# Patient Record
Sex: FEMALE | Race: WHITE | NOT HISPANIC OR LATINO | Employment: OTHER | URBAN - METROPOLITAN AREA
[De-identification: names, ages, dates, MRNs, and addresses within clinical notes are randomized per-mention and may not be internally consistent; named-entity substitution may affect disease eponyms.]

---

## 2017-03-08 ENCOUNTER — OFFICE VISIT (OUTPATIENT)
Dept: ENDOCRINOLOGY | Facility: CLINIC | Age: 22
End: 2017-03-08

## 2017-03-08 VITALS
BODY MASS INDEX: 24.98 KG/M2 | OXYGEN SATURATION: 97 % | HEIGHT: 71 IN | SYSTOLIC BLOOD PRESSURE: 110 MMHG | WEIGHT: 178.4 LBS | HEART RATE: 67 BPM | DIASTOLIC BLOOD PRESSURE: 68 MMHG

## 2017-03-08 DIAGNOSIS — E03.8 HYPOTHYROIDISM DUE TO HASHIMOTO'S THYROIDITIS: Primary | ICD-10-CM

## 2017-03-08 DIAGNOSIS — E06.3 HYPOTHYROIDISM DUE TO HASHIMOTO'S THYROIDITIS: Primary | ICD-10-CM

## 2017-03-08 PROCEDURE — 99243 OFF/OP CNSLTJ NEW/EST LOW 30: CPT | Performed by: INTERNAL MEDICINE

## 2017-03-08 RX ORDER — LEVOTHYROXINE SODIUM 88 UG/1
88 TABLET ORAL DAILY
Qty: 30 TABLET | Refills: 11 | Status: SHIPPED | OUTPATIENT
Start: 2017-03-08 | End: 2018-04-24 | Stop reason: SDUPTHER

## 2017-05-25 ENCOUNTER — TELEPHONE (OUTPATIENT)
Dept: FAMILY MEDICINE CLINIC | Facility: CLINIC | Age: 22
End: 2017-05-25

## 2017-05-25 RX ORDER — ESCITALOPRAM OXALATE 20 MG/1
20 TABLET ORAL DAILY
Qty: 30 TABLET | Refills: 0 | Status: SHIPPED | OUTPATIENT
Start: 2017-05-25 | End: 2017-07-05 | Stop reason: SDUPTHER

## 2017-07-05 ENCOUNTER — OFFICE VISIT (OUTPATIENT)
Dept: FAMILY MEDICINE CLINIC | Facility: CLINIC | Age: 22
End: 2017-07-05

## 2017-07-05 VITALS
HEART RATE: 75 BPM | HEIGHT: 71 IN | OXYGEN SATURATION: 99 % | TEMPERATURE: 98.9 F | BODY MASS INDEX: 23.8 KG/M2 | DIASTOLIC BLOOD PRESSURE: 64 MMHG | WEIGHT: 170 LBS | SYSTOLIC BLOOD PRESSURE: 100 MMHG

## 2017-07-05 DIAGNOSIS — F32.A DEPRESSION, UNSPECIFIED DEPRESSION TYPE: Primary | ICD-10-CM

## 2017-07-05 PROCEDURE — 99213 OFFICE O/P EST LOW 20 MIN: CPT | Performed by: NURSE PRACTITIONER

## 2017-07-05 RX ORDER — ESCITALOPRAM OXALATE 20 MG/1
20 TABLET ORAL DAILY
Qty: 30 TABLET | Refills: 5 | Status: SHIPPED | OUTPATIENT
Start: 2017-07-05 | End: 2018-05-15 | Stop reason: SDUPTHER

## 2017-07-05 RX ORDER — CLINDAMYCIN PHOSPHATE 10 UG/ML
LOTION TOPICAL
COMMUNITY
Start: 2017-07-01 | End: 2018-06-01

## 2017-07-05 NOTE — PROGRESS NOTES
Subjective   Kelley Crews is a 22 y.o. female.     History of Present Illness Patient is here for a refill of her Lexapro. Has been on it for years. No side effects. Feels it is effective. Doing well on Levothyroxine. She is seeing an endocrinologist.    The following portions of the patient's history were reviewed and updated as appropriate: allergies, current medications, past family history, past medical history, past social history, past surgical history and problem list.    Review of Systems   Constitutional: Negative for fatigue, fever and unexpected weight change.   HENT: Negative for congestion, hearing loss, nosebleeds, rhinorrhea, sore throat, trouble swallowing and voice change.    Eyes: Negative for pain, discharge, redness and visual disturbance.   Respiratory: Negative for cough, chest tightness, shortness of breath and wheezing.    Cardiovascular: Negative for chest pain, palpitations and leg swelling.   Gastrointestinal: Negative for abdominal distention, abdominal pain, anal bleeding, blood in stool, constipation, diarrhea, nausea and vomiting.   Endocrine: Negative for cold intolerance, heat intolerance, polydipsia, polyphagia and polyuria.   Genitourinary: Negative for dysuria, flank pain, frequency and hematuria.   Musculoskeletal: Negative for arthralgias, gait problem, joint swelling and myalgias.   Skin: Negative for color change and rash.   Neurological: Negative for dizziness, tremors, seizures, syncope, speech difficulty, weakness, numbness and headaches.   Hematological: Negative.    Psychiatric/Behavioral: Negative.        Objective   Physical Exam   Constitutional: She is oriented to person, place, and time. She appears well-developed and well-nourished. No distress.   HENT:   Head: Normocephalic and atraumatic.   Right Ear: Tympanic membrane and external ear normal.   Left Ear: Tympanic membrane and external ear normal.   Nose: Nose normal.   Mouth/Throat: Oropharynx is clear and  moist. No oropharyngeal exudate.   Eyes: Conjunctivae are normal. Pupils are equal, round, and reactive to light. Right eye exhibits no discharge. Left eye exhibits no discharge. No scleral icterus.   Neck: Neck supple. No tracheal deviation present. No thyromegaly present.   Cardiovascular: Normal rate, regular rhythm and normal heart sounds.  Exam reveals no gallop and no friction rub.    No murmur heard.  Pulmonary/Chest: Effort normal and breath sounds normal. No respiratory distress. She has no wheezes.   Abdominal: Soft. Bowel sounds are normal. She exhibits no distension and no mass. There is no tenderness.   Musculoskeletal: She exhibits no edema or deformity.   Lymphadenopathy:     She has no cervical adenopathy.   Neurological: She is alert and oriented to person, place, and time. Coordination normal.   Skin: Skin is warm and dry. No rash noted. No erythema.   Psychiatric: She has a normal mood and affect. Her speech is normal and behavior is normal. Judgment and thought content normal.   Nursing note and vitals reviewed.      Assessment/Plan   Kelley was seen today for depression.    Diagnoses and all orders for this visit:    Depression, unspecified depression type  -     escitalopram (LEXAPRO) 20 MG tablet; Take 1 tablet by mouth Daily.    Discussed the nature of the disease including, risks, complications, implications, management, safe and proper use of medications.  Follow up symptoms persist or worsen.

## 2017-09-13 ENCOUNTER — OFFICE VISIT (OUTPATIENT)
Dept: ENDOCRINOLOGY | Facility: CLINIC | Age: 22
End: 2017-09-13

## 2017-09-13 VITALS
BODY MASS INDEX: 24.63 KG/M2 | DIASTOLIC BLOOD PRESSURE: 64 MMHG | OXYGEN SATURATION: 99 % | HEIGHT: 71 IN | SYSTOLIC BLOOD PRESSURE: 112 MMHG | HEART RATE: 75 BPM | WEIGHT: 175.9 LBS

## 2017-09-13 DIAGNOSIS — E06.3 HYPOTHYROIDISM DUE TO HASHIMOTO'S THYROIDITIS: Primary | ICD-10-CM

## 2017-09-13 DIAGNOSIS — E03.8 HYPOTHYROIDISM DUE TO HASHIMOTO'S THYROIDITIS: Primary | ICD-10-CM

## 2017-09-13 LAB — TSH SERPL DL<=0.05 MIU/L-ACNC: 0.69 MIU/ML (ref 0.35–5.35)

## 2017-09-13 PROCEDURE — 84443 ASSAY THYROID STIM HORMONE: CPT | Performed by: INTERNAL MEDICINE

## 2017-09-13 PROCEDURE — 99213 OFFICE O/P EST LOW 20 MIN: CPT | Performed by: INTERNAL MEDICINE

## 2017-09-13 NOTE — PROGRESS NOTES
"Chief Complaint   Patient presents with   • Hypothyroidism     Follow UP        HPI:   Kelley Crews is a 22 y.o.female who returns to [facility]ocrine Clinic for f/u evaluation of her hypothyroidism.Last visit 03/08/2017. Her history is as follows:    1) hypothyroidism due to Hashimoto's thyroiditis:  - Diagnosed in approximately 2012  - Initial Endocrine Clinic visit 03/2017. Dose increased to 88 mcg that visit.     Current dose: Levothyroxine 88 µg   - Takes in AM, on an empty stomach. Waits at least 30 min before food/hot liquids  - good compliance with medication    Family medical history: No known thyroid cancer, mother sister and multiple other maternal relatives with hypothyroidism    On review, she feels overall better with dose increase. Still with intermittent fatigue which she attributes to her work/sleep schedule    Review of Systems   Constitutional: Positive for fatigue (intermiitent).   Eyes: Negative.    Respiratory: Negative.    Cardiovascular: Negative.    Gastrointestinal: Negative.    Endocrine: Negative.    Genitourinary: Negative.    Musculoskeletal: Negative.    Skin: Negative.    Allergic/Immunologic: Negative.    Neurological: Positive for headaches (intermittent).   Hematological: Negative.    Psychiatric/Behavioral: Negative.         History of anxiety and depression controlled with medication     The following portions of the patient's history were reviewed and updated as appropriate: allergies, current medications, past family history, past medical history, past social history, past surgical history and problem list.    /64  Pulse 75  Ht 71\" (180.3 cm)  Wt 175 lb 14.4 oz (79.8 kg)  SpO2 99%  BMI 24.53 kg/m2  Physical Exam   Constitutional: She is oriented to person, place, and time. She appears well-developed. No distress.   HENT:   Head: Normocephalic.   Mouth/Throat: Oropharynx is clear and moist.   Eyes: Conjunctivae and EOM are normal. Pupils are equal, round, and reactive to " light.   Neck: No tracheal deviation present. No thyromegaly present.   No palpable thyroid nodules     Cardiovascular: Normal rate, regular rhythm and normal heart sounds.    No murmur heard.  Pulmonary/Chest: Effort normal and breath sounds normal. No respiratory distress.   Lymphadenopathy:     She has no cervical adenopathy.   Neurological: She is alert and oriented to person, place, and time. No cranial nerve deficit.   Skin: Skin is warm and dry. She is not diaphoretic. No erythema.   Psychiatric: She has a normal mood and affect. Her behavior is normal.   Vitals reviewed.    LABS/IMAGING: outside records reviewed and summarized in HPI  Office Visit on 09/13/2017   Component Date Value Ref Range Status   • TSH 09/13/2017 0.686  0.350 - 5.350 mIU/mL Final     ASSESSMENT/PLAN:  1) hypothyroidism due to Hashimoto's thyroiditis:  - Clinically euthyroid on exam  - Continue dose to 88 µg.      - Reviewed proper levothyroxine administration, and factors to avoid that decrease medication potency and medication absorption.   - Reviewed need for extra thyroid hormone medication during pregnancy. Advised patient to take an extra 2 pills per week as soon as she is aware of pregnancy and contact our clinic or OB clinic as soon as possible so that labs can be completed and a dose adjustment can be made    Return to clinic 6 months    Counseling was given to patient for the following topics:  instructions for management, see details in assessment/plan. Total face to face time of the encounter was 15 minutes and 10 minutes was spent counseling.

## 2017-09-15 ENCOUNTER — TELEPHONE (OUTPATIENT)
Dept: ENDOCRINOLOGY | Facility: CLINIC | Age: 22
End: 2017-09-15

## 2017-11-08 ENCOUNTER — TRANSCRIBE ORDERS (OUTPATIENT)
Dept: ADMINISTRATIVE | Facility: HOSPITAL | Age: 22
End: 2017-11-08

## 2017-11-08 DIAGNOSIS — R92.8 ABNORMAL MAMMOGRAPHY: Primary | ICD-10-CM

## 2017-11-10 ENCOUNTER — HOSPITAL ENCOUNTER (OUTPATIENT)
Dept: ULTRASOUND IMAGING | Facility: HOSPITAL | Age: 22
Discharge: HOME | End: 2017-11-10
Admitting: NURSE PRACTITIONER

## 2017-11-10 DIAGNOSIS — R92.8 ABNORMAL MAMMOGRAPHY: ICD-10-CM

## 2017-11-10 PROCEDURE — 76642 ULTRASOUND BREAST LIMITED: CPT

## 2017-11-10 PROCEDURE — 76642 ULTRASOUND BREAST LIMITED: CPT | Performed by: RADIOLOGY

## 2018-04-24 DIAGNOSIS — E06.3 HYPOTHYROIDISM DUE TO HASHIMOTO'S THYROIDITIS: ICD-10-CM

## 2018-04-24 DIAGNOSIS — E03.8 HYPOTHYROIDISM DUE TO HASHIMOTO'S THYROIDITIS: ICD-10-CM

## 2018-04-24 RX ORDER — LEVOTHYROXINE SODIUM 88 UG/1
TABLET ORAL
Qty: 30 TABLET | Refills: 2 | Status: SHIPPED | OUTPATIENT
Start: 2018-04-24 | End: 2018-06-01 | Stop reason: SDUPTHER

## 2018-05-15 DIAGNOSIS — F32.A DEPRESSION, UNSPECIFIED DEPRESSION TYPE: ICD-10-CM

## 2018-05-15 RX ORDER — ESCITALOPRAM OXALATE 20 MG/1
20 TABLET ORAL DAILY
Qty: 30 TABLET | Refills: 0 | Status: SHIPPED | OUTPATIENT
Start: 2018-05-15 | End: 2018-06-01 | Stop reason: SDUPTHER

## 2018-06-01 ENCOUNTER — OFFICE VISIT (OUTPATIENT)
Dept: FAMILY MEDICINE CLINIC | Facility: CLINIC | Age: 23
End: 2018-06-01

## 2018-06-01 VITALS
TEMPERATURE: 98.7 F | OXYGEN SATURATION: 98 % | HEART RATE: 102 BPM | BODY MASS INDEX: 23.57 KG/M2 | RESPIRATION RATE: 16 BRPM | DIASTOLIC BLOOD PRESSURE: 70 MMHG | WEIGHT: 169 LBS | SYSTOLIC BLOOD PRESSURE: 100 MMHG

## 2018-06-01 DIAGNOSIS — E03.8 HYPOTHYROIDISM DUE TO HASHIMOTO'S THYROIDITIS: Primary | ICD-10-CM

## 2018-06-01 DIAGNOSIS — E06.3 HYPOTHYROIDISM DUE TO HASHIMOTO'S THYROIDITIS: Primary | ICD-10-CM

## 2018-06-01 DIAGNOSIS — F32.A DEPRESSION, UNSPECIFIED DEPRESSION TYPE: ICD-10-CM

## 2018-06-01 PROCEDURE — 99213 OFFICE O/P EST LOW 20 MIN: CPT | Performed by: FAMILY MEDICINE

## 2018-06-01 RX ORDER — ESCITALOPRAM OXALATE 20 MG/1
20 TABLET ORAL DAILY
Qty: 90 TABLET | Refills: 0 | Status: SHIPPED | OUTPATIENT
Start: 2018-06-01 | End: 2018-10-03 | Stop reason: DRUGHIGH

## 2018-06-01 RX ORDER — LEVOTHYROXINE SODIUM 88 UG/1
88 TABLET ORAL DAILY
Qty: 90 TABLET | Refills: 0 | Status: SHIPPED | OUTPATIENT
Start: 2018-06-01 | End: 2018-08-18 | Stop reason: DRUGHIGH

## 2018-06-01 NOTE — PROGRESS NOTES
Subjective   Kelley Crews is a 22 y.o. female.     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 Needs medication renewal.  Feels well. Saw endocrinology for thyroid.   The following portions of the patient's history were reviewed and updated as appropriate: allergies, current medications, past family history, past medical history, past social history, past surgical history and problem list.    Review of Systems   Respiratory: Negative.    Gastrointestinal: Negative.    Neurological: Positive for weakness. Negative for dizziness and headaches.       Objective   Physical Exam   Constitutional: She appears well-developed.   Pulmonary/Chest: Effort normal.   Neurological: She is alert.   Psychiatric: She has a normal mood and affect.   Vitals reviewed.      Assessment/Plan   Kelley was seen today for med refill.    Diagnoses and all orders for this visit:    Hypothyroidism due to Hashimoto's thyroiditis  -     levothyroxine (SYNTHROID, LEVOTHROID) 88 MCG tablet; Take 1 tablet by mouth Daily.    Depression, unspecified depression type  -     escitalopram (LEXAPRO) 20 MG tablet; Take 1 tablet by mouth Daily.

## 2018-08-15 ENCOUNTER — OFFICE VISIT (OUTPATIENT)
Dept: ENDOCRINOLOGY | Facility: CLINIC | Age: 23
End: 2018-08-15

## 2018-08-15 VITALS
OXYGEN SATURATION: 99 % | DIASTOLIC BLOOD PRESSURE: 84 MMHG | HEART RATE: 61 BPM | SYSTOLIC BLOOD PRESSURE: 116 MMHG | HEIGHT: 71 IN | WEIGHT: 170 LBS | BODY MASS INDEX: 23.8 KG/M2

## 2018-08-15 DIAGNOSIS — E06.3 HYPOTHYROIDISM DUE TO HASHIMOTO'S THYROIDITIS: Primary | ICD-10-CM

## 2018-08-15 DIAGNOSIS — E03.8 HYPOTHYROIDISM DUE TO HASHIMOTO'S THYROIDITIS: Primary | ICD-10-CM

## 2018-08-15 LAB
T4 FREE SERPL-MCNC: 1.12 NG/DL (ref 0.89–1.76)
TSH SERPL DL<=0.05 MIU/L-ACNC: 3.17 MIU/ML (ref 0.35–5.35)

## 2018-08-15 PROCEDURE — 84439 ASSAY OF FREE THYROXINE: CPT | Performed by: INTERNAL MEDICINE

## 2018-08-15 PROCEDURE — 84443 ASSAY THYROID STIM HORMONE: CPT | Performed by: INTERNAL MEDICINE

## 2018-08-15 PROCEDURE — 99213 OFFICE O/P EST LOW 20 MIN: CPT | Performed by: INTERNAL MEDICINE

## 2018-08-15 NOTE — PROGRESS NOTES
"Chief Complaint   Patient presents with   • Hypothyroidism due to Hashimoto's thyroiditis     Patient in today for f/u        HPI:   Kelley Crews is a 23 y.o.female who returns to Endocrine Clinic for f/u evaluation of her hypothyroidism.Last visit 09/13/2017. Her history is as follows:    Interim Events: had to miss her 03/2018 appt  - has been in [place] for the past 2 months.  - reports taking medication regularly    1) hypothyroidism due to Hashimoto's thyroiditis:  - Diagnosed in approximately 2012  - Initial Endocrine Clinic visit 03/2017. Dose increased to 88 mcg that visit.     Current dose: Levothyroxine 88 µg   - Takes in AM, on an empty stomach. Waits at least 30 min before food/hot liquids  - good compliance with medication  - not on biotin    Family medical history: No known thyroid cancer, mother sister and multiple other maternal relatives with hypothyroidism    On review, she felt better initially when her dose was increased. Notes more fatigue lately despite adequate sleep.     Review of Systems   Constitutional: Positive for fatigue.        Weight stable   Eyes: Negative.    Respiratory: Negative.    Cardiovascular: Negative.    Gastrointestinal: Negative.    Endocrine: Negative.    Genitourinary: Negative.    Musculoskeletal: Negative.    Skin: Negative.    Allergic/Immunologic: Negative.    Neurological: Positive for headaches (intermittent).   Hematological: Negative.    Psychiatric/Behavioral: Negative.         History of anxiety and depression controlled with medication     The following portions of the patient's history were reviewed and updated as appropriate: allergies, current medications, past family history, past medical history, past social history, past surgical history and problem list.    /84   Pulse 61   Ht 180.3 cm (71\")   Wt 77.1 kg (170 lb)   SpO2 99%   BMI 23.71 kg/m²   Physical Exam   Constitutional: She is oriented to person, place, and time. She appears " well-developed. No distress.   HENT:   Head: Normocephalic.   Mouth/Throat: Oropharynx is clear and moist.   Eyes: Pupils are equal, round, and reactive to light. Conjunctivae and EOM are normal.   Neck: No tracheal deviation present. No thyromegaly present.   No palpable thyroid nodules     Cardiovascular: Normal rate, regular rhythm and normal heart sounds.    No murmur heard.  Pulmonary/Chest: Effort normal and breath sounds normal. No respiratory distress.   Lymphadenopathy:     She has no cervical adenopathy.   Neurological: She is alert and oriented to person, place, and time. No cranial nerve deficit.   Skin: Skin is warm and dry. She is not diaphoretic. No erythema.   Psychiatric: She has a normal mood and affect. Her behavior is normal.   Vitals reviewed.    LABS/IMAGING: outside records reviewed and summarized in HPI  Office Visit on 08/15/2018   Component Date Value Ref Range Status   • TSH 08/15/2018 3.175  0.350 - 5.350 mIU/mL Final   • Free T4 08/15/2018 1.12  0.89 - 1.76 ng/dL Final     ASSESSMENT/PLAN:  1) hypothyroidism due to Hashimoto's thyroiditis:  - Clinically euthyroid on exam. Reports fatigue despite adequate sleep.  - TSH checked today, normal but in the upper end of range   - Discussed increasing dose to 100 µg to see if she notes any benefit.      - Reviewed proper levothyroxine administration, and factors to avoid that decrease medication potency and medication absorption.   - Reviewed need for extra thyroid hormone medication during pregnancy. Advised patient to take an extra 2 pills per week as soon as she is aware of pregnancy and contact our clinic or OB clinic as soon as possible so that labs can be completed and a dose adjustment can be made    Return to clinic 6 months

## 2018-08-18 ENCOUNTER — TELEPHONE (OUTPATIENT)
Dept: ENDOCRINOLOGY | Facility: CLINIC | Age: 23
End: 2018-08-18

## 2018-08-18 RX ORDER — LEVOTHYROXINE SODIUM 0.1 MG/1
100 TABLET ORAL EVERY MORNING
Qty: 90 TABLET | Refills: 3 | Status: SHIPPED | OUTPATIENT
Start: 2018-08-18 | End: 2019-08-22 | Stop reason: SDUPTHER

## 2018-10-03 ENCOUNTER — OFFICE VISIT (OUTPATIENT)
Dept: FAMILY MEDICINE CLINIC | Facility: CLINIC | Age: 23
End: 2018-10-03

## 2018-10-03 VITALS
HEIGHT: 71 IN | SYSTOLIC BLOOD PRESSURE: 118 MMHG | OXYGEN SATURATION: 98 % | BODY MASS INDEX: 24.08 KG/M2 | WEIGHT: 172 LBS | DIASTOLIC BLOOD PRESSURE: 78 MMHG | RESPIRATION RATE: 16 BRPM | HEART RATE: 79 BPM | TEMPERATURE: 98.1 F

## 2018-10-03 DIAGNOSIS — F32.0 CURRENT MILD EPISODE OF MAJOR DEPRESSIVE DISORDER WITHOUT PRIOR EPISODE (HCC): Primary | ICD-10-CM

## 2018-10-03 PROCEDURE — 99213 OFFICE O/P EST LOW 20 MIN: CPT | Performed by: NURSE PRACTITIONER

## 2018-10-03 RX ORDER — ESCITALOPRAM OXALATE 10 MG/1
10 TABLET ORAL DAILY
Qty: 30 TABLET | Refills: 0 | Status: SHIPPED | OUTPATIENT
Start: 2018-10-03 | End: 2019-02-13

## 2018-10-03 NOTE — PROGRESS NOTES
Subjective   Kelley Crews is a 23 y.o. female.     History of Present Illness Ms Crews is seeing a therapist and is doing quite well with her depression. She would like to wean off of her Lexapro. She is stable and denies suicidal thoughts.  She also is scheduled to see a gyn for birth control pills and pap smear.    The following portions of the patient's history were reviewed and updated as appropriate: allergies, current medications, past family history, past medical history, past social history, past surgical history and problem list.    Review of Systems   Constitutional: Negative for appetite change, fever, unexpected weight gain and unexpected weight loss.   HENT: Negative for congestion, nosebleeds, sore throat and trouble swallowing.    Eyes: Negative for visual disturbance.   Respiratory: Negative for cough, shortness of breath and wheezing.    Cardiovascular: Negative for chest pain, palpitations and leg swelling.   Gastrointestinal: Negative for abdominal pain, blood in stool, constipation, diarrhea, nausea and vomiting.   Endocrine: Negative for polydipsia, polyphagia and polyuria.   Genitourinary: Negative for dysuria, frequency and hematuria.   Musculoskeletal: Negative for arthralgias, joint swelling and myalgias.   Skin: Negative for rash.   Neurological: Negative for dizziness, seizures, syncope and numbness.   Hematological: Negative for adenopathy. Does not bruise/bleed easily.   Psychiatric/Behavioral: Positive for depressed mood. Negative for behavioral problems and sleep disturbance. The patient is not nervous/anxious.        Objective   Physical Exam   Constitutional: She is oriented to person, place, and time. She appears well-developed and well-nourished. No distress.   HENT:   Head: Normocephalic and atraumatic.   Right Ear: External ear normal.   Left Ear: External ear normal.   Nose: Nose normal.   Eyes: Conjunctivae are normal. Right eye exhibits no discharge. Left eye exhibits no  discharge. No scleral icterus.   Neck: Normal range of motion.   Cardiovascular: Normal rate.    Pulmonary/Chest: Effort normal. No respiratory distress.   Musculoskeletal: She exhibits no deformity.   Neurological: She is alert and oriented to person, place, and time. Coordination normal.   Skin: Skin is warm and dry.   Psychiatric: She has a normal mood and affect. Her behavior is normal. Judgment and thought content normal.   Vitals reviewed.        Assessment/Plan   Kelley was seen today for med refill.    Diagnoses and all orders for this visit:    Current mild episode of major depressive disorder without prior episode (CMS/[facility])  -     escitalopram (LEXAPRO) 10 MG tablet; Take 1 tablet by mouth Daily.      Discussed how to wean and I wrote down a schedule to wean her off over 3 weeks. Discussed possible side effects and that she can always go back on it if needed.  She verbalized understanding. Keep appt with gyn.

## 2019-02-13 ENCOUNTER — OFFICE VISIT (OUTPATIENT)
Dept: ENDOCRINOLOGY | Facility: CLINIC | Age: 24
End: 2019-02-13

## 2019-02-13 VITALS
OXYGEN SATURATION: 98 % | SYSTOLIC BLOOD PRESSURE: 110 MMHG | HEART RATE: 97 BPM | HEIGHT: 71 IN | WEIGHT: 174.8 LBS | BODY MASS INDEX: 24.47 KG/M2 | DIASTOLIC BLOOD PRESSURE: 70 MMHG

## 2019-02-13 DIAGNOSIS — E06.3 HYPOTHYROIDISM DUE TO HASHIMOTO'S THYROIDITIS: Primary | ICD-10-CM

## 2019-02-13 DIAGNOSIS — E03.8 HYPOTHYROIDISM DUE TO HASHIMOTO'S THYROIDITIS: Primary | ICD-10-CM

## 2019-02-13 LAB — TSH SERPL DL<=0.05 MIU/L-ACNC: 1.01 MIU/ML (ref 0.35–5.35)

## 2019-02-13 PROCEDURE — 99213 OFFICE O/P EST LOW 20 MIN: CPT | Performed by: INTERNAL MEDICINE

## 2019-02-13 PROCEDURE — 84443 ASSAY THYROID STIM HORMONE: CPT | Performed by: INTERNAL MEDICINE

## 2019-02-13 RX ORDER — NORETHINDRONE ACETATE AND ETHINYL ESTRADIOL, ETHINYL ESTRADIOL AND FERROUS FUMARATE 1MG-10(24)
KIT ORAL
COMMUNITY
Start: 2019-02-08 | End: 2022-08-15 | Stop reason: SDUPTHER

## 2019-02-13 RX ORDER — OSELTAMIVIR PHOSPHATE 75 MG/1
CAPSULE ORAL
COMMUNITY
Start: 2019-01-30 | End: 2019-08-21

## 2019-02-13 NOTE — PROGRESS NOTES
"Chief Complaint   Patient presents with   • Hypothyroidism due to hashimoto's thyroiditis     f/u     HPI:   Kelley Crews is a 23 y.o.female who returns to Endocrine Clinic for f/u evaluation of her hypothyroidism.Last visit 08/15/2018. Her history is as follows:    Interim Events:   - overall feeling well  - [place] Was able to take medication consistently  - Weight stable    1) hypothyroidism due to Hashimoto's thyroiditis:  - Diagnosed in approximately 2012  - Initial Endocrine Clinic visit 03/2017. Dose increased to 88 mcg that visit.     Current dose: Levothyroxine 100 µg   - Takes in AM, on an empty stomach. Waits at least 30 min before food/hot liquids  - good compliance with medication  - not on high dose biotin    Family medical history: No known thyroid cancer, mother sister and multiple other maternal relatives with hypothyroidism    On review, notes some fatigue lately due to inadequate sleep.     Review of Systems   Constitutional: Positive for fatigue.        Weight stable   Eyes: Negative.    Respiratory: Negative.    Cardiovascular: Negative.    Gastrointestinal: Negative.    Endocrine: Negative.    Genitourinary: Negative.    Musculoskeletal: Negative.    Skin: Negative.    Allergic/Immunologic: Negative.    Neurological: Negative.  Negative for headaches.   Hematological: Negative.    Psychiatric/Behavioral: Positive for sleep disturbance.     The following portions of the patient's history were reviewed and updated as appropriate: allergies, current medications, past family history, past medical history, past social history, past surgical history and problem list.    /70   Pulse 97   Ht 180.3 cm (71\")   Wt 79.3 kg (174 lb 12.8 oz)   SpO2 98%   BMI 24.38 kg/m²   Physical Exam   Constitutional: She is oriented to person, place, and time. She appears well-developed. No distress.   HENT:   Head: Normocephalic.   Mouth/Throat: Oropharynx is clear and moist.   Eyes: " Conjunctivae and EOM are normal. Pupils are equal, round, and reactive to light.   Neck: No tracheal deviation present. No thyromegaly present.   No palpable thyroid nodules     Cardiovascular: Normal rate, regular rhythm and normal heart sounds.   No murmur heard.  Pulmonary/Chest: Effort normal and breath sounds normal. No respiratory distress.   Lymphadenopathy:     She has no cervical adenopathy.   Neurological: She is alert and oriented to person, place, and time. No cranial nerve deficit.   Skin: Skin is warm and dry. She is not diaphoretic. No erythema.   Psychiatric: She has a normal mood and affect. Her behavior is normal.   Vitals reviewed.    LABS/IMAGING: outside records reviewed and summarized in HPI  Office Visit on 02/13/2019   Component Date Value Ref Range Status   • TSH 02/13/2019 1.010  0.350 - 5.350 mIU/mL Final     ASSESSMENT/PLAN:  1) hypothyroidism due to Hashimoto's thyroiditis:  - Clinically euthyroid on exam.   - TSH checked today WNL   - Continue Levothyroxine 100 µg       - Reviewed proper levothyroxine administration, and factors to avoid that decrease medication potency and medication absorption.     Return to clinic 6 months

## 2019-02-22 ENCOUNTER — TELEPHONE (OUTPATIENT)
Dept: ENDOCRINOLOGY | Facility: CLINIC | Age: 24
End: 2019-02-22

## 2019-08-21 ENCOUNTER — OFFICE VISIT (OUTPATIENT)
Dept: FAMILY MEDICINE CLINIC | Facility: CLINIC | Age: 24
End: 2019-08-21

## 2019-08-21 VITALS
RESPIRATION RATE: 16 BRPM | BODY MASS INDEX: 23.54 KG/M2 | DIASTOLIC BLOOD PRESSURE: 70 MMHG | WEIGHT: 168.13 LBS | OXYGEN SATURATION: 98 % | TEMPERATURE: 98.1 F | HEIGHT: 71 IN | SYSTOLIC BLOOD PRESSURE: 122 MMHG | HEART RATE: 86 BPM

## 2019-08-21 DIAGNOSIS — E06.3 HYPOTHYROIDISM DUE TO HASHIMOTO'S THYROIDITIS: Primary | ICD-10-CM

## 2019-08-21 DIAGNOSIS — E03.8 HYPOTHYROIDISM DUE TO HASHIMOTO'S THYROIDITIS: Primary | ICD-10-CM

## 2019-08-21 PROCEDURE — 99213 OFFICE O/P EST LOW 20 MIN: CPT | Performed by: NURSE PRACTITIONER

## 2019-08-21 PROCEDURE — 36415 COLL VENOUS BLD VENIPUNCTURE: CPT | Performed by: NURSE PRACTITIONER

## 2019-08-21 PROCEDURE — 84439 ASSAY OF FREE THYROXINE: CPT | Performed by: NURSE PRACTITIONER

## 2019-08-21 PROCEDURE — 84443 ASSAY THYROID STIM HORMONE: CPT | Performed by: NURSE PRACTITIONER

## 2019-08-21 NOTE — PROGRESS NOTES
Subjective   Kelley Crews is a 24 y.o. female.     History of Present Illness Has Hashimoto's thyroiditis.  Sees Dr Ramirez but will run out of meds today.  Would like to have labs and refills here for this visit.  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 Overall she feels well. No palpitations change in bowels or weight. Moods are stable. Compliant with meds.      Outpatient Encounter Medications as of 8/21/2019   Medication Sig Dispense Refill   • levothyroxine (SYNTHROID, LEVOTHROID) 100 MCG tablet Take 1 tablet by mouth Every Morning. 90 tablet 3   • LO LOESTRIN FE 1 MG-10 MCG / 10 MCG tablet      • [DISCONTINUED] oseltamivir (TAMIFLU) 75 MG capsule        No facility-administered encounter medications on file as of 8/21/2019.        The following portions of the patient's history were reviewed and updated as appropriate: allergies, current medications, past family history, past medical history, past social history, past surgical history and problem list.    Review of Systems   Constitutional: Negative for appetite change, fever, unexpected weight gain and unexpected weight loss.   HENT: Negative for congestion, nosebleeds, sore throat and trouble swallowing.    Eyes: Negative for visual disturbance.   Respiratory: Negative for cough, shortness of breath and wheezing.    Cardiovascular: Negative for chest pain, palpitations and leg swelling.   Gastrointestinal: Negative for abdominal pain, blood in stool, constipation, diarrhea, nausea and vomiting.   Endocrine: Negative for polydipsia, polyphagia and polyuria.   Genitourinary: Negative for dysuria, frequency and hematuria.   Musculoskeletal: Negative for arthralgias, joint swelling and myalgias.   Skin: Negative for rash.   Neurological: Negative for dizziness, seizures, syncope and numbness.   Hematological: Negative for adenopathy. Does not bruise/bleed easily.   Psychiatric/Behavioral: Negative for behavioral  "problems, sleep disturbance and depressed mood. The patient is not nervous/anxious.        Objective     Visit Vitals  /70 (BP Location: Right arm, Patient Position: Sitting)   Pulse 86   Temp 98.1 °F (36.7 °C) (Oral)   Resp 16   Ht 180.3 cm (71\")   Wt 76.3 kg (168 lb 2 oz)   SpO2 98%   BMI 23.45 kg/m²       Physical Exam   Constitutional: She is oriented to person, place, and time. She appears well-developed and well-nourished. No distress.   HENT:   Head: Normocephalic and atraumatic.   Right Ear: External ear normal.   Left Ear: External ear normal.   Nose: Nose normal.   Eyes: Conjunctivae are normal. Right eye exhibits no discharge. Left eye exhibits no discharge. No scleral icterus.   Neck: Normal range of motion. No JVD present. No tracheal deviation present. No thyromegaly present.   Cardiovascular: Normal rate.   Pulmonary/Chest: Effort normal. No respiratory distress.   Musculoskeletal: She exhibits no deformity.   Lymphadenopathy:     She has no cervical adenopathy.   Neurological: She is alert and oriented to person, place, and time. Coordination normal.   Skin: Skin is warm and dry.   Psychiatric: She has a normal mood and affect. Her behavior is normal. Judgment and thought content normal.   Vitals reviewed.        Assessment/Plan   Kelley was seen today for hypothyroidism.    Diagnoses and all orders for this visit:    Hypothyroidism due to Hashimoto's thyroiditis  -     TSH  -     T4, Free      Will obtain labs and refills meds based on results. Encouraged her to make appointment with Dr Ramirez in 6 months.  Also encouraged her to make appt for cpx and fasting labs.  Discussed the nature of the disease including, risks, complications, implications, management, safe and proper use of medications. Encouraged therapeutic lifestyle changes including low calorie diet with plenty of fruits and vegetables, daily exercise, medication compliance, and keeping scheduled follow up appointments with me and " any other providers. Encouraged patient to have appointment for complete physical, fasting labs, appropriate screenings, and immunizations on an annual basis.

## 2019-08-22 DIAGNOSIS — E06.3 HYPOTHYROIDISM DUE TO HASHIMOTO'S THYROIDITIS: ICD-10-CM

## 2019-08-22 DIAGNOSIS — E03.8 HYPOTHYROIDISM DUE TO HASHIMOTO'S THYROIDITIS: ICD-10-CM

## 2019-08-22 LAB
T4 FREE SERPL-MCNC: 1.84 NG/DL (ref 0.93–1.7)
TSH SERPL DL<=0.05 MIU/L-ACNC: 0.7 MIU/ML (ref 0.27–4.2)

## 2019-08-22 RX ORDER — LEVOTHYROXINE SODIUM 0.1 MG/1
100 TABLET ORAL EVERY MORNING
Qty: 90 TABLET | Refills: 3 | Status: SHIPPED | OUTPATIENT
Start: 2019-08-22 | End: 2020-06-17 | Stop reason: SDUPTHER

## 2019-12-19 ENCOUNTER — TELEPHONE (OUTPATIENT)
Dept: FAMILY MEDICINE CLINIC | Facility: CLINIC | Age: 24
End: 2019-12-19

## 2020-01-03 ENCOUNTER — TELEPHONE (OUTPATIENT)
Dept: FAMILY MEDICINE CLINIC | Facility: CLINIC | Age: 25
End: 2020-01-03

## 2020-01-03 NOTE — TELEPHONE ENCOUNTER
Pt called and is checking on the status of signing the letter she dropped off 1/2/2020. Pt needs this letter to be signed as early as possible. 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 Please call Pt when letter is ready to      Pt call back  [phone]

## 2020-06-15 ENCOUNTER — OFFICE VISIT (OUTPATIENT)
Dept: ENDOCRINOLOGY | Facility: CLINIC | Age: 25
End: 2020-06-15

## 2020-06-15 ENCOUNTER — LAB (OUTPATIENT)
Dept: LAB | Facility: HOSPITAL | Age: 25
End: 2020-06-15

## 2020-06-15 VITALS
WEIGHT: 175.2 LBS | HEIGHT: 71 IN | SYSTOLIC BLOOD PRESSURE: 122 MMHG | BODY MASS INDEX: 24.53 KG/M2 | OXYGEN SATURATION: 98 % | DIASTOLIC BLOOD PRESSURE: 76 MMHG | HEART RATE: 100 BPM

## 2020-06-15 DIAGNOSIS — E03.8 HYPOTHYROIDISM DUE TO HASHIMOTO'S THYROIDITIS: Primary | ICD-10-CM

## 2020-06-15 DIAGNOSIS — E06.3 HYPOTHYROIDISM DUE TO HASHIMOTO'S THYROIDITIS: Primary | ICD-10-CM

## 2020-06-15 DIAGNOSIS — R35.0 URINARY FREQUENCY: ICD-10-CM

## 2020-06-15 LAB
BILIRUB UR QL STRIP: NEGATIVE
CLARITY UR: CLEAR
COLOR UR: YELLOW
GLUCOSE UR STRIP-MCNC: NEGATIVE MG/DL
HGB UR QL STRIP.AUTO: NEGATIVE
KETONES UR QL STRIP: NEGATIVE
LEUKOCYTE ESTERASE UR QL STRIP.AUTO: NEGATIVE
NITRITE UR QL STRIP: NEGATIVE
PH UR STRIP.AUTO: 5.5 [PH] (ref 5–8)
PROT UR QL STRIP: NEGATIVE
SP GR UR STRIP: 1.03 (ref 1–1.03)
UROBILINOGEN UR QL STRIP: NORMAL

## 2020-06-15 PROCEDURE — 84443 ASSAY THYROID STIM HORMONE: CPT | Performed by: INTERNAL MEDICINE

## 2020-06-15 PROCEDURE — 99213 OFFICE O/P EST LOW 20 MIN: CPT | Performed by: INTERNAL MEDICINE

## 2020-06-15 PROCEDURE — 81003 URINALYSIS AUTO W/O SCOPE: CPT | Performed by: INTERNAL MEDICINE

## 2020-06-15 NOTE — PROGRESS NOTES
"Chief Complaint   Patient presents with   • Hypothyroidism     Follow Up     HPI:   Kelley Crews is a 24 y.o.female who returns to Endocrine Clinic for f/u evaluation of her hypothyroidism.Last visit 02/13/2019. Her history is as follows:    Interim Events:   - overall feeling well  - Weight stable  - Reports having urinary frequency currently for the past few weeks. Pt is not sexually active.    1) hypothyroidism due to Hashimoto's thyroiditis:  - Diagnosed in approximately 2012  - Initial Endocrine Clinic visit 03/2017. Dose increased to 88 mcg that visit.     Current dose: Levothyroxine 100 µg   - Takes in AM, on an empty stomach. Waits at least 30 min before food/hot liquids  - good compliance with medication  - not on high dose biotin    Family medical history: No known thyroid cancer, mother sister and multiple other maternal relatives with hypothyroidism      Review of Systems   Constitutional: Negative.  Negative for fatigue.        Weight stable   Eyes: Negative.    Respiratory: Negative.    Cardiovascular: Negative.    Gastrointestinal: Negative.    Endocrine: Negative.    Genitourinary: Positive for frequency. Negative for difficulty urinating and dysuria.   Musculoskeletal: Negative.    Skin: Negative.    Allergic/Immunologic: Negative.    Neurological: Negative.  Negative for headaches.   Hematological: Negative.    Psychiatric/Behavioral: Positive for sleep disturbance.       The following portions of the patient's history were reviewed and updated as appropriate: allergies, current medications, past family history, past medical history, past social history, past surgical history and problem list.    /76   Pulse 100   Ht 180.3 cm (71\")   Wt 79.5 kg (175 lb 3.2 oz)   SpO2 98%   BMI 24.44 kg/m²   Physical Exam   Constitutional: She is oriented to person, place, and time. She appears well-developed. No distress.   HENT:   Head: Normocephalic.   Mouth/Throat: Oropharynx is clear and moist. "   Eyes: Pupils are equal, round, and reactive to light. Conjunctivae and EOM are normal.   Neck: No tracheal deviation present. No thyromegaly present.   No palpable thyroid nodules     Cardiovascular: Normal rate, regular rhythm and normal heart sounds.   No murmur heard.  Pulmonary/Chest: Effort normal and breath sounds normal. No respiratory distress.   Lymphadenopathy:     She has no cervical adenopathy.   Neurological: She is alert and oriented to person, place, and time. No cranial nerve deficit.   Skin: Skin is warm and dry. She is not diaphoretic. No erythema.   Psychiatric: She has a normal mood and affect. Her behavior is normal.   Vitals reviewed.    LABS/IMAGING: outside records reviewed and summarized in HPI  Office Visit on 06/15/2020   Component Date Value Ref Range Status   • TSH 06/15/2020 3.480  0.270 - 4.200 uIU/mL Final   • Color, UA 06/15/2020 Yellow  Yellow, Straw Final   • Appearance, UA 06/15/2020 Clear  Clear Final   • pH, UA 06/15/2020 5.5  5.0 - 8.0 Final   • Specific Gravity, UA 06/15/2020 1.026  1.005 - 1.030 Final   • Glucose, UA 06/15/2020 Negative  Negative Final   • Ketones, UA 06/15/2020 Negative  Negative Final   • Bilirubin, UA 06/15/2020 Negative  Negative Final   • Blood, UA 06/15/2020 Negative  Negative Final   • Protein, UA 06/15/2020 Negative  Negative Final   • Leuk Esterase, UA 06/15/2020 Negative  Negative Final   • Nitrite, UA 06/15/2020 Negative  Negative Final   • Urobilinogen, UA 06/15/2020 0.2 E.U./dL  0.2 - 1.0 E.U./dL Final     ASSESSMENT/PLAN:  1) hypothyroidism due to Hashimoto's thyroiditis:  - Clinically euthyroid on exam.   - TSH checked today WNL   - Continue Levothyroxine 100 µg      - Rx for one year sent to her pharmacy   - Reviewed proper levothyroxine administration, and factors to avoid that decrease medication potency and medication absorption.     - Pt may be moving out of the [place] one year for her job    2) urinary frequency:  - UA checked  today was negative for infection    Return to clinic 12 months

## 2020-06-16 LAB — TSH SERPL DL<=0.05 MIU/L-ACNC: 3.48 UIU/ML (ref 0.27–4.2)

## 2020-06-17 ENCOUNTER — TELEPHONE (OUTPATIENT)
Dept: ENDOCRINOLOGY | Facility: CLINIC | Age: 25
End: 2020-06-17

## 2020-06-17 RX ORDER — LEVOTHYROXINE SODIUM 0.1 MG/1
100 TABLET ORAL EVERY MORNING
Qty: 90 TABLET | Refills: 3 | Status: SHIPPED | OUTPATIENT
Start: 2020-06-17 | End: 2020-08-13 | Stop reason: SDUPTHER

## 2020-06-17 NOTE — TELEPHONE ENCOUNTER
Pt's voicemail was full. I have sent a test message to her.   Informed patient about lab results. See clinic note from 06/15/2020 for details.   Signed: Bee Ramirez MD

## 2020-06-17 NOTE — TELEPHONE ENCOUNTER
PATIENT RETURNED MISSED CALL.    CALL BACK [phone] OK TO [facility] IF NO ANSWER. PATIENT WILL HAVE SPOTTY CELL SERVICE TODAY.

## 2020-07-02 ENCOUNTER — TELEPHONE (OUTPATIENT)
Dept: FAMILY MEDICINE CLINIC | Facility: CLINIC | Age: 25
End: 2020-07-02

## 2020-07-02 NOTE — TELEPHONE ENCOUNTER
PATIENT STATES SHE MIGHT HAVE BEEN EXPOSED TO COVID-19 BECAUSE HER BROTHER-IN-LAW TESTED POSITIVE FOR COVID-19 (HE FOUND OUT RESULTS TODAY, 7/2).  PATIENT HAS NOT BEEN AROUND THE BROTHER-IN-LAW BUT HER PARENTS THAT SHE LIVES WITH HAS BEEN AROUND HIM.  PATIENT STATES SHE NOW FEEL LIKE SHE HAS A SORE THROAT.  PATIENT WORKS AS A [employer] AND IS WANTING TO KNOW WHERE SHE MIGHT GET TESTED TO MAKE SURE SHE IS PROACTIVE.      PLEASE CALL PATIENT [phone]

## 2020-08-13 ENCOUNTER — TELEPHONE (OUTPATIENT)
Dept: ENDOCRINOLOGY | Facility: CLINIC | Age: 25
End: 2020-08-13

## 2020-08-13 DIAGNOSIS — E03.8 HYPOTHYROIDISM DUE TO HASHIMOTO'S THYROIDITIS: ICD-10-CM

## 2020-08-13 DIAGNOSIS — E06.3 HYPOTHYROIDISM DUE TO HASHIMOTO'S THYROIDITIS: ICD-10-CM

## 2020-08-13 RX ORDER — LEVOTHYROXINE SODIUM 0.1 MG/1
100 TABLET ORAL EVERY MORNING
Qty: 90 TABLET | Refills: 3 | Status: SHIPPED | OUTPATIENT
Start: 2020-08-13 | End: 2021-06-28 | Stop reason: SDUPTHER

## 2020-08-13 NOTE — TELEPHONE ENCOUNTER
PT SWITCHED INSURANCES AND IS HAVING TO USE A DIFFERENT PHARMACY FOR HER MEDICATIONS.    CAN HER LEVOTHYROXINE PLEASE CALLED IN TO THE [facility] IN [place].

## 2021-06-14 ENCOUNTER — OFFICE VISIT (OUTPATIENT)
Dept: ENDOCRINOLOGY | Facility: CLINIC | Age: 26
End: 2021-06-14

## 2021-06-14 VITALS
HEART RATE: 75 BPM | SYSTOLIC BLOOD PRESSURE: 124 MMHG | WEIGHT: 176 LBS | BODY MASS INDEX: 23.84 KG/M2 | DIASTOLIC BLOOD PRESSURE: 60 MMHG | HEIGHT: 72 IN | OXYGEN SATURATION: 98 %

## 2021-06-14 DIAGNOSIS — E06.3 HYPOTHYROIDISM DUE TO HASHIMOTO'S THYROIDITIS: Primary | ICD-10-CM

## 2021-06-14 DIAGNOSIS — E03.8 HYPOTHYROIDISM DUE TO HASHIMOTO'S THYROIDITIS: Primary | ICD-10-CM

## 2021-06-14 PROCEDURE — 99213 OFFICE O/P EST LOW 20 MIN: CPT | Performed by: INTERNAL MEDICINE

## 2021-06-14 NOTE — PROGRESS NOTES
"Chief Complaint   Patient presents with   • Hypothyroidism     follow up      HPI:   Kelley Crews is a 25 y.o.female who returns to Endocrine Clinic for f/u evaluation of her hypothyroidism.Last visit 06/15/2020. Her history is as follows:    Interim Events:   - overall feeling well  - Weight stable  - Working for [place] [employer] in [facility]    1) hypothyroidism due to Hashimoto's thyroiditis:  - Diagnosed in approximately 2012  - Initial Endocrine Clinic visit 03/2017. Dose increased to 88 mcg that visit.     Current dose: Levothyroxine 100 µg   - Takes in AM, on an empty stomach. Waits at least 30 min before food/hot liquids  - good compliance with medication  - not on high dose biotin    Family medical history: No known thyroid cancer, mother sister and multiple other maternal relatives with hypothyroidism      Review of Systems   Constitutional: Negative.  Negative for fatigue.        Weight stable   Eyes: Negative.    Respiratory: Negative.    Cardiovascular: Negative.    Gastrointestinal: Negative.    Endocrine: Negative.    Genitourinary: Negative.  Negative for difficulty urinating, dysuria and frequency.   Musculoskeletal: Negative.    Skin: Negative.    Allergic/Immunologic: Negative.    Neurological: Negative.  Negative for headaches.   Hematological: Negative.    Psychiatric/Behavioral: Negative for sleep disturbance.       The following portions of the patient's history were reviewed and updated as appropriate: allergies, current medications, past family history, past medical history, past social history, past surgical history and problem list.    /60   Pulse 75   Ht 182.9 cm (72\")   Wt 79.8 kg (176 lb)   SpO2 98%   BMI 23.87 kg/m²   Physical Exam   Constitutional: She is oriented to person, place, and time. She appears well-developed. No distress.   HENT:   Head: Normocephalic.   Eyes: Pupils are equal, round, and reactive to light. Conjunctivae are normal.   Neck: No tracheal " deviation present. No thyromegaly present.   No palpable thyroid nodules     Cardiovascular: Normal rate, regular rhythm and normal heart sounds.   No murmur heard.  Pulmonary/Chest: Effort normal and breath sounds normal. No respiratory distress.   Lymphadenopathy:     She has no cervical adenopathy.   Neurological: She is alert and oriented to person, place, and time. No cranial nerve deficit.   Skin: Skin is warm and dry. She is not diaphoretic. No erythema.   Psychiatric: Her behavior is normal.   Vitals reviewed.    LABS/IMAGING: outside records reviewed and summarized in HPI  Office Visit on 06/14/2021   Component Date Value Ref Range Status   • TSH 06/14/2021 1.210  0.450 - 4.500 uIU/mL Final     ASSESSMENT/PLAN:  1) hypothyroidism due to Hashimoto's thyroiditis:  - Clinically euthyroid on exam.   - TSH checked today WNL   - Continue Levothyroxine 100 µg      - Rx for one year sent to her pharmacy   - Reviewed proper levothyroxine administration, and factors to avoid that decrease medication potency and medication absorption.     Return to clinic 12 months    Counseling was given to patient for the following topics:  instructions for management and impressions, see details in assessment/plan. Total face to face time of the encounter was 15 minutes and 10 minutes was spent counseling.    Signed: Bee Ramirez MD

## 2021-06-15 LAB — TSH SERPL DL<=0.005 MIU/L-ACNC: 1.21 UIU/ML (ref 0.45–4.5)

## 2021-06-28 RX ORDER — LEVOTHYROXINE SODIUM 0.1 MG/1
100 TABLET ORAL EVERY MORNING
Qty: 90 TABLET | Refills: 3 | Status: SHIPPED | OUTPATIENT
Start: 2021-06-28 | End: 2021-08-02

## 2021-08-02 DIAGNOSIS — E06.3 HYPOTHYROIDISM DUE TO HASHIMOTO'S THYROIDITIS: ICD-10-CM

## 2021-08-02 DIAGNOSIS — E03.8 HYPOTHYROIDISM DUE TO HASHIMOTO'S THYROIDITIS: ICD-10-CM

## 2021-08-03 RX ORDER — LEVOTHYROXINE SODIUM 0.1 MG/1
100 TABLET ORAL EVERY MORNING
Qty: 90 TABLET | Refills: 3 | Status: SHIPPED | OUTPATIENT
Start: 2021-08-03 | End: 2022-06-28 | Stop reason: SDUPTHER

## 2022-06-15 ENCOUNTER — OFFICE VISIT (OUTPATIENT)
Dept: ENDOCRINOLOGY | Facility: CLINIC | Age: 27
End: 2022-06-15

## 2022-06-15 VITALS
DIASTOLIC BLOOD PRESSURE: 64 MMHG | HEART RATE: 86 BPM | SYSTOLIC BLOOD PRESSURE: 122 MMHG | HEIGHT: 72 IN | OXYGEN SATURATION: 98 % | BODY MASS INDEX: 24.52 KG/M2 | WEIGHT: 181 LBS

## 2022-06-15 DIAGNOSIS — E06.3 HYPOTHYROIDISM DUE TO HASHIMOTO'S THYROIDITIS: Primary | ICD-10-CM

## 2022-06-15 DIAGNOSIS — E03.8 HYPOTHYROIDISM DUE TO HASHIMOTO'S THYROIDITIS: Primary | ICD-10-CM

## 2022-06-15 PROCEDURE — 99213 OFFICE O/P EST LOW 20 MIN: CPT | Performed by: INTERNAL MEDICINE

## 2022-06-15 RX ORDER — CHOLECALCIFEROL (VITAMIN D3) 125 MCG
10 CAPSULE ORAL
COMMUNITY

## 2022-06-15 RX ORDER — CHOLECALCIFEROL (VITAMIN D3) 25 MCG
TABLET,CHEWABLE ORAL
COMMUNITY
End: 2022-10-27

## 2022-06-15 NOTE — PROGRESS NOTES
"Chief Complaint   Patient presents with   • Hypothyroidism     Follow up      HPI:   Kelley Crews is a 26 y.o.female who returns to Endocrine Clinic for f/u evaluation of her hypothyroidism.Last visit 06/14/2021. Her history is as follows:    Interim Events:   - overall feeling well  - Working for [place] [employer] in [facility]  - taking melatonin PRN to help with sleep    1) hypothyroidism due to Hashimoto's thyroiditis:  - Diagnosed in approximately 2012  - Initial Endocrine Clinic visit 03/2017. Dose increased to 88 mcg that visit.     Current dose: Levothyroxine 100 µg   - Takes in AM, on an empty stomach. Waits at least 30 min before food/hot liquids  - good compliance with medication  - not on high dose biotin    Family medical history: No known thyroid cancer, mother sister and multiple other maternal relatives with hypothyroidism      Review of Systems   Constitutional: Negative.  Negative for fatigue.        Weight gain, mild   Eyes: Negative.    Respiratory: Negative.    Cardiovascular: Negative.    Gastrointestinal: Negative.    Endocrine: Negative.    Genitourinary: Negative.  Negative for difficulty urinating, dysuria and frequency.   Musculoskeletal: Negative.    Skin: Negative.    Allergic/Immunologic: Negative.    Neurological: Negative.  Negative for headaches.   Hematological: Negative.    Psychiatric/Behavioral: Positive for sleep disturbance (intermittent).       The following portions of the patient's history were reviewed and updated as appropriate: allergies, current medications, past family history, past medical history, past social history, past surgical history and problem list.      /64   Pulse 86   Ht 182.9 cm (72\")   Wt 82.1 kg (181 lb)   SpO2 98%   BMI 24.55 kg/m²   Physical Exam   Constitutional: She is oriented to person, place, and time. She appears well-developed. No distress.   HENT:   Head: Normocephalic.   Eyes: Pupils are equal, round, and reactive to light. " Conjunctivae are normal.   Neck: No tracheal deviation present. No thyromegaly present.   No palpable thyroid nodules     Cardiovascular: Normal rate, regular rhythm and normal heart sounds.   No murmur heard.  Pulmonary/Chest: Effort normal and breath sounds normal. No respiratory distress.   Lymphadenopathy:     She has no cervical adenopathy.   Neurological: She is alert and oriented to person, place, and time. No cranial nerve deficit.   Skin: Skin is warm and dry. She is not diaphoretic. No erythema.   Psychiatric: Her behavior is normal.   Vitals reviewed.    LABS/IMAGING: outside records reviewed and summarized in HPI  Office Visit on 06/15/2022   Component Date Value Ref Range Status   • TSH 06/15/2022 1.460  0.450 - 4.500 uIU/mL Final     ASSESSMENT/PLAN:  1) hypothyroidism due to Hashimoto's thyroiditis:  - Clinically euthyroid on exam.   - TSH checked today WNL   - Continue Levothyroxine 100 µg      - Rx for one year sent to her pharmacy   - Reviewed proper levothyroxine administration, and factors to avoid that decrease medication potency and medication absorption.     Return to clinic 12 months. Discussed with pt that if she establishes care with a PCP, the PCP can take over management of her hypothyroidism.    Counseling was given to patient for the following topics:  instructions for management and impressions, see details in assessment/plan. Total face to face time of the encounter was 15 minutes and 10 minutes was spent counseling.    Signed: Bee Ramirez MD

## 2022-06-16 LAB — TSH SERPL DL<=0.005 MIU/L-ACNC: 1.46 UIU/ML (ref 0.45–4.5)

## 2022-06-28 RX ORDER — LEVOTHYROXINE SODIUM 0.1 MG/1
100 TABLET ORAL EVERY MORNING
Qty: 90 TABLET | Refills: 3 | Status: SHIPPED | OUTPATIENT
Start: 2022-06-28 | End: 2022-11-03 | Stop reason: SDUPTHER

## 2022-08-15 ENCOUNTER — LAB (OUTPATIENT)
Dept: LAB | Facility: HOSPITAL | Age: 27
End: 2022-08-15

## 2022-08-15 ENCOUNTER — PATIENT MESSAGE (OUTPATIENT)
Dept: FAMILY MEDICINE CLINIC | Facility: CLINIC | Age: 27
End: 2022-08-15

## 2022-08-15 ENCOUNTER — PATIENT ROUNDING (BHMG ONLY) (OUTPATIENT)
Dept: FAMILY MEDICINE CLINIC | Facility: CLINIC | Age: 27
End: 2022-08-15

## 2022-08-15 ENCOUNTER — OFFICE VISIT (OUTPATIENT)
Dept: FAMILY MEDICINE CLINIC | Facility: CLINIC | Age: 27
End: 2022-08-15

## 2022-08-15 VITALS
RESPIRATION RATE: 16 BRPM | OXYGEN SATURATION: 96 % | DIASTOLIC BLOOD PRESSURE: 80 MMHG | HEART RATE: 77 BPM | BODY MASS INDEX: 24.65 KG/M2 | WEIGHT: 182 LBS | SYSTOLIC BLOOD PRESSURE: 110 MMHG | HEIGHT: 72 IN

## 2022-08-15 DIAGNOSIS — E53.8 VITAMIN B12 DEFICIENCY: ICD-10-CM

## 2022-08-15 DIAGNOSIS — E06.3 HYPOTHYROIDISM DUE TO HASHIMOTO'S THYROIDITIS: ICD-10-CM

## 2022-08-15 DIAGNOSIS — Z11.59 ENCOUNTER FOR HEPATITIS C SCREENING TEST FOR LOW RISK PATIENT: ICD-10-CM

## 2022-08-15 DIAGNOSIS — Z76.89 ENCOUNTER TO ESTABLISH CARE: Primary | ICD-10-CM

## 2022-08-15 DIAGNOSIS — Z30.41 ENCOUNTER FOR SURVEILLANCE OF CONTRACEPTIVE PILLS: ICD-10-CM

## 2022-08-15 DIAGNOSIS — Z13.0 SCREENING FOR DEFICIENCY ANEMIA: ICD-10-CM

## 2022-08-15 DIAGNOSIS — F41.9 ANXIETY: ICD-10-CM

## 2022-08-15 DIAGNOSIS — F32.1 CURRENT MODERATE EPISODE OF MAJOR DEPRESSIVE DISORDER WITHOUT PRIOR EPISODE: ICD-10-CM

## 2022-08-15 DIAGNOSIS — E03.8 HYPOTHYROIDISM DUE TO HASHIMOTO'S THYROIDITIS: ICD-10-CM

## 2022-08-15 DIAGNOSIS — Z13.1 SCREENING FOR DIABETES MELLITUS: ICD-10-CM

## 2022-08-15 DIAGNOSIS — E55.9 VITAMIN D DEFICIENCY: ICD-10-CM

## 2022-08-15 LAB
ALBUMIN SERPL-MCNC: 4.5 G/DL (ref 3.5–5.2)
ALBUMIN/GLOB SERPL: 1.7 G/DL
ALP SERPL-CCNC: 43 U/L (ref 39–117)
ALT SERPL W P-5'-P-CCNC: 12 U/L (ref 1–33)
ANION GAP SERPL CALCULATED.3IONS-SCNC: 11.9 MMOL/L (ref 5–15)
AST SERPL-CCNC: 14 U/L (ref 1–32)
BASOPHILS # BLD AUTO: 0.03 10*3/MM3 (ref 0–0.2)
BASOPHILS NFR BLD AUTO: 0.4 % (ref 0–1.5)
BILIRUB SERPL-MCNC: 0.6 MG/DL (ref 0–1.2)
BUN SERPL-MCNC: 7 MG/DL (ref 6–20)
BUN/CREAT SERPL: 9 (ref 7–25)
CALCIUM SPEC-SCNC: 9.6 MG/DL (ref 8.6–10.5)
CHLORIDE SERPL-SCNC: 103 MMOL/L (ref 98–107)
CO2 SERPL-SCNC: 23.1 MMOL/L (ref 22–29)
CREAT SERPL-MCNC: 0.78 MG/DL (ref 0.57–1)
DEPRECATED RDW RBC AUTO: 40.4 FL (ref 37–54)
EGFRCR SERPLBLD CKD-EPI 2021: 106.3 ML/MIN/1.73
EOSINOPHIL # BLD AUTO: 0.13 10*3/MM3 (ref 0–0.4)
EOSINOPHIL NFR BLD AUTO: 1.8 % (ref 0.3–6.2)
ERYTHROCYTE [DISTWIDTH] IN BLOOD BY AUTOMATED COUNT: 12.6 % (ref 12.3–15.4)
GLOBULIN UR ELPH-MCNC: 2.7 GM/DL
GLUCOSE SERPL-MCNC: 79 MG/DL (ref 65–99)
HCT VFR BLD AUTO: 40.3 % (ref 34–46.6)
HGB BLD-MCNC: 13.2 G/DL (ref 12–15.9)
IMM GRANULOCYTES # BLD AUTO: 0.01 10*3/MM3 (ref 0–0.05)
IMM GRANULOCYTES NFR BLD AUTO: 0.1 % (ref 0–0.5)
LYMPHOCYTES # BLD AUTO: 1.86 10*3/MM3 (ref 0.7–3.1)
LYMPHOCYTES NFR BLD AUTO: 25.5 % (ref 19.6–45.3)
MCH RBC QN AUTO: 29.1 PG (ref 26.6–33)
MCHC RBC AUTO-ENTMCNC: 32.8 G/DL (ref 31.5–35.7)
MCV RBC AUTO: 89 FL (ref 79–97)
MONOCYTES # BLD AUTO: 0.59 10*3/MM3 (ref 0.1–0.9)
MONOCYTES NFR BLD AUTO: 8.1 % (ref 5–12)
NEUTROPHILS NFR BLD AUTO: 4.66 10*3/MM3 (ref 1.7–7)
NEUTROPHILS NFR BLD AUTO: 64.1 % (ref 42.7–76)
NRBC BLD AUTO-RTO: 0 /100 WBC (ref 0–0.2)
PLATELET # BLD AUTO: 325 10*3/MM3 (ref 140–450)
PMV BLD AUTO: 9.8 FL (ref 6–12)
POTASSIUM SERPL-SCNC: 4.6 MMOL/L (ref 3.5–5.2)
PROT SERPL-MCNC: 7.2 G/DL (ref 6–8.5)
RBC # BLD AUTO: 4.53 10*6/MM3 (ref 3.77–5.28)
SODIUM SERPL-SCNC: 138 MMOL/L (ref 136–145)
WBC NRBC COR # BLD: 7.28 10*3/MM3 (ref 3.4–10.8)

## 2022-08-15 PROCEDURE — 85025 COMPLETE CBC W/AUTO DIFF WBC: CPT | Performed by: PHYSICIAN ASSISTANT

## 2022-08-15 PROCEDURE — 80053 COMPREHEN METABOLIC PANEL: CPT | Performed by: PHYSICIAN ASSISTANT

## 2022-08-15 PROCEDURE — 82607 VITAMIN B-12: CPT | Performed by: PHYSICIAN ASSISTANT

## 2022-08-15 PROCEDURE — 82306 VITAMIN D 25 HYDROXY: CPT | Performed by: PHYSICIAN ASSISTANT

## 2022-08-15 PROCEDURE — 99204 OFFICE O/P NEW MOD 45 MIN: CPT | Performed by: PHYSICIAN ASSISTANT

## 2022-08-15 PROCEDURE — 86803 HEPATITIS C AB TEST: CPT | Performed by: PHYSICIAN ASSISTANT

## 2022-08-15 RX ORDER — NORETHINDRONE ACETATE AND ETHINYL ESTRADIOL, ETHINYL ESTRADIOL AND FERROUS FUMARATE 1MG-10(24)
1 KIT ORAL DAILY
Qty: 84 TABLET | Refills: 3 | Status: SHIPPED | OUTPATIENT
Start: 2022-08-15

## 2022-08-15 NOTE — PROGRESS NOTES
Chief Complaint   Patient presents with   • Establish Care   • Depression       [place]     Kelley Crews is a 27 y.o. female who presents to establish care.  Patient has not been to a primary care provider in a few years.  She has been established with endocrinology, Dr. Ramirez, who manages her for thyroidism.  She is currently on levothyroxine 100 MCG daily and has been on this for years with good control.  Dr. Ramirez recently told her she can follow with her primary for this.  She was seen by gynecology last year in September and had Pap smear completed which was normal.  She is on Lo Loestrin birth control and has been on this for a few years.  She tolerates the medication well and it helps with her acne and menstrual cycle.  She is not currently active and has no abnormal symptoms of discharge or urinary complaints.  She would like to avoid going back to her previous gynecologist if possible.    Main concern today is ongoing depression and anxiety.  She denies any suicidal thoughts or self injury.  She was previously on Lexapro 20 mg daily in 2018 and is unsure if this was the right medication for her.  She did get some benefit with that though.  She denies any adverse effects.  Her sister is currently on Wellbutrin and Zoloft and does well with this combination.  Patient has noticed some issues with attention and focus and thinks this may be related to her anxiety which has been worse recently.  Other than the Lexapro she has not tried any other medications.      Chief Complaint   Patient presents with   • Establish Care   • Depression       Past Medical History:   Diagnosis Date   • Anxiety    • Depression    • Hypothyroidism        Past Surgical History:   Procedure Laterality Date   • KNEE SURGERY Bilateral     05/2013, 06/2013   • TONSILLECTOMY  04/2000   • WISDOM TOOTH EXTRACTION  11/2010       Family History   Problem Relation Age of Onset   • Other Mother    • Hypothyroidism Mother    • No Known Problems  Father    • Hypothyroidism Sister    • Other Maternal Grandmother    • Hypothyroidism Maternal Grandmother    • Colon polyps Maternal Grandfather    • Breast cancer Paternal Grandmother 75   • Heart attack Paternal Grandfather    • Ovarian cancer Neg Hx    • Colon cancer Neg Hx        Social History     Socioeconomic History   • Marital status: Single   Tobacco Use   • Smoking status: Never Smoker   • Smokeless tobacco: Never Used   Substance and Sexual Activity   • Alcohol use: Yes     Comment: Drink 1-2 times per month   • Drug use: No   • Sexual activity: Not Currently     Birth control/protection: Pill       No Known Allergies    ROS    Review of Systems   Constitutional: Positive for fatigue. Negative for activity change, appetite change, chills, diaphoresis, fever, unexpected weight gain and unexpected weight loss.   HENT: Negative for congestion, dental problem, ear pain, hearing loss, nosebleeds, sinus pressure, sore throat and trouble swallowing.    Eyes: Negative for blurred vision, pain, redness and visual disturbance.   Respiratory: Negative for apnea, cough, chest tightness, shortness of breath and wheezing.    Cardiovascular: Negative for chest pain, palpitations and leg swelling.   Gastrointestinal: Negative for abdominal distention, abdominal pain, anal bleeding, blood in stool, constipation, diarrhea, nausea, vomiting, GERD and indigestion.   Endocrine: Negative for cold intolerance, heat intolerance, polydipsia, polyphagia and polyuria.   Genitourinary: Negative for decreased urine volume, difficulty urinating, dysuria, frequency, hematuria, urgency and urinary incontinence.   Musculoskeletal: Negative for gait problem, joint swelling and bursitis.   Skin: Negative for dry skin, rash, skin lesions and wound.   Neurological: Negative for dizziness, tremors, seizures, syncope, speech difficulty, weakness, light-headedness, headache, memory problem and confusion.   Hematological: Does not bruise/bleed  "easily.   Psychiatric/Behavioral: Positive for agitation, sleep disturbance, depressed mood and stress. Negative for behavioral problems, decreased concentration, hallucinations, self-injury and suicidal ideas. The patient is nervous/anxious.        Vitals:    08/15/22 1158   BP: 110/80   Pulse: 77   Resp: 16   SpO2: 96%   Weight: 82.6 kg (182 lb)   Height: 182.9 cm (72.01\")     Body mass index is 24.68 kg/m².    Current Outpatient Medications on File Prior to Visit   Medication Sig Dispense Refill   • Cyanocobalamin (B-12) 1000 MCG capsule Take  by mouth.     • levothyroxine (SYNTHROID, LEVOTHROID) 100 MCG tablet Take 1 tablet by mouth Every Morning. On an empty stomach 90 tablet 3   • melatonin 5 MG tablet tablet Take 10 mg by mouth.     • [DISCONTINUED] LO LOESTRIN FE 1 MG-10 MCG / 10 MCG tablet        No current facility-administered medications on file prior to visit.       Results for orders placed or performed in visit on 06/15/22   TSH    Specimen: Blood    Blood  Release to [facility]   Result Value Ref Range    TSH 1.460 0.450 - 4.500 uIU/mL       PE  Physical Exam  Vitals reviewed.   Constitutional:       General: She is not in acute distress.     Appearance: Normal appearance. She is well-developed and normal weight. She is not ill-appearing or diaphoretic.   HENT:      Head: Normocephalic and atraumatic.   Eyes:      Extraocular Movements: Extraocular movements intact.      Conjunctiva/sclera: Conjunctivae normal.   Cardiovascular:      Rate and Rhythm: Normal rate and regular rhythm.      Heart sounds: Normal heart sounds.   Pulmonary:      Effort: Pulmonary effort is normal.      Breath sounds: Normal breath sounds.   Musculoskeletal:         General: Normal range of motion.      Cervical back: Normal range of motion.      Right lower leg: No edema.      Left lower leg: No edema.   Skin:     General: Skin is warm.      Findings: No erythema or rash.   Neurological:      General: No focal deficit present.    "   Mental Status: She is alert.   Psychiatric:         Attention and Perception: Attention and perception normal. She is attentive.         Mood and Affect: Mood is anxious and depressed.         Speech: Speech normal.         Behavior: Behavior normal. Behavior is cooperative.         Thought Content: Thought content normal.         Cognition and Memory: Cognition and memory normal.         Judgment: Judgment normal.         A/P    Diagnoses and all orders for this visit:    1. Encounter to establish care (Primary)    2. Hypothyroidism due to Hashimoto's thyroiditis  On Levothyroxine 100 mcg daily.  Been on the same dose of medication for years.  Follows with endocrinology once a year but was told she can have her primary care manage this now.    3. Anxiety  -     sertraline (Zoloft) 50 MG tablet; Take 1 tablet by mouth Daily. Take 0.5 tablet (25 mg) daily for 7 days, then 1 tablet (50 mg) daily  Dispense: 30 tablet; Refill: 1    4. Current moderate episode of major depressive disorder without prior episode (HCC)  -     sertraline (Zoloft) 50 MG tablet; Take 1 tablet by mouth Daily. Take 0.5 tablet (25 mg) daily for 7 days, then 1 tablet (50 mg) daily  Dispense: 30 tablet; Refill: 1  Trial of Zoloft 50 mg daily.  Return in 4 weeks.  Call sooner if symptoms worsen or change, or she has concerns with medication.    5. Encounter for surveillance of contraceptive pills  -     Lo Loestrin Fe 1 MG-10 MCG / 10 MCG tablet; Take 1 tablet by mouth Daily.  Dispense: 84 tablet; Refill: 3    6. Vitamin B12 deficiency  -     Vitamin B12; Future    7. Vitamin D deficiency  -     Vitamin D 25 Hydroxy; Future    8. Screening for deficiency anemia  -     CBC Auto Differential; Future    9. Screening for diabetes mellitus  -     Comprehensive Metabolic Panel; Future    10. Encounter for hepatitis C screening test for low risk patient  -     Hepatitis C Antibody; Future         Plan of care reviewed with patient at the conclusion of  today's visit. Education was provided regarding diagnosis, management and any prescribed or recommended OTC medications.  Patient verbalizes understanding of and agreement with management plan.    Return in about 4 weeks (around 9/12/2022) for Annual physical.     My Paul PA-C

## 2022-08-16 LAB
25(OH)D3 SERPL-MCNC: 35.1 NG/ML (ref 30–100)
HCV AB SER DONR QL: NORMAL
VIT B12 BLD-MCNC: 1888 PG/ML (ref 211–946)

## 2022-09-26 ENCOUNTER — OFFICE VISIT (OUTPATIENT)
Dept: FAMILY MEDICINE CLINIC | Facility: CLINIC | Age: 27
End: 2022-09-26

## 2022-09-26 VITALS
HEART RATE: 110 BPM | BODY MASS INDEX: 24.43 KG/M2 | WEIGHT: 180.4 LBS | TEMPERATURE: 97.7 F | HEIGHT: 72 IN | OXYGEN SATURATION: 97 % | DIASTOLIC BLOOD PRESSURE: 70 MMHG | SYSTOLIC BLOOD PRESSURE: 118 MMHG

## 2022-09-26 DIAGNOSIS — F41.9 ANXIETY: ICD-10-CM

## 2022-09-26 DIAGNOSIS — F32.1 CURRENT MODERATE EPISODE OF MAJOR DEPRESSIVE DISORDER WITHOUT PRIOR EPISODE: ICD-10-CM

## 2022-09-26 DIAGNOSIS — Z23 NEED FOR COVID-19 VACCINE: ICD-10-CM

## 2022-09-26 DIAGNOSIS — Z00.00 PHYSICAL EXAM, ANNUAL: Primary | ICD-10-CM

## 2022-09-26 PROCEDURE — 0124A PR ADM SARSCOV2 30MCG/0.3ML BST: CPT | Performed by: PHYSICIAN ASSISTANT

## 2022-09-26 PROCEDURE — 91312 COVID-19 (PFIZER) BIVALENT BOOSTER 12+YRS: CPT | Performed by: PHYSICIAN ASSISTANT

## 2022-09-26 PROCEDURE — 99395 PREV VISIT EST AGE 18-39: CPT | Performed by: PHYSICIAN ASSISTANT

## 2022-09-26 RX ORDER — SERTRALINE HYDROCHLORIDE 100 MG/1
100 TABLET, FILM COATED ORAL DAILY
Qty: 90 TABLET | Refills: 1 | Status: SHIPPED | OUTPATIENT
Start: 2022-09-26 | End: 2023-01-31 | Stop reason: SDUPTHER

## 2022-09-26 RX ORDER — SERTRALINE HYDROCHLORIDE 100 MG/1
100 TABLET, FILM COATED ORAL DAILY
Qty: 90 TABLET | Refills: 1 | Status: SHIPPED | OUTPATIENT
Start: 2022-09-26 | End: 2022-09-26 | Stop reason: SDUPTHER

## 2022-09-26 NOTE — PROGRESS NOTES
Patient Care Team:  My Paul PA-C as PCP - General (Physician Assistant)  Bee Ramirez MD as Consulting Physician (Endocrinology)     Chief complaint: Patient is in today for a physical     Kelleymargi Crews is a 27 y.o. female who presents for her yearly physical exam.     Patient presents for annual physical exam and management of anxiety and hypothyroidism.  Patient is compliant on all medications.  Patient is tolerating Sertraline 50 mg daily.  She reports improvement in mood and anxiety but is still having anxiety and depression on a daily basis.  She is taking Levothyroxine 100 mcg and has been on same dose for awhile.  She had normal pap smear last year.         Review of Systems   Constitutional: Positive for fatigue. Negative for chills, diaphoresis and fever.   HENT: Negative for congestion, ear pain, hearing loss, postnasal drip, rhinorrhea and sore throat.    Eyes: Negative for blurred vision and pain.   Respiratory: Negative for cough, shortness of breath and wheezing.    Cardiovascular: Negative for chest pain and leg swelling.   Gastrointestinal: Negative for abdominal pain, blood in stool, constipation, diarrhea, nausea, vomiting and indigestion.   Endocrine: Negative for polyuria.   Genitourinary: Negative for dysuria, flank pain and hematuria.   Musculoskeletal: Negative for arthralgias, gait problem and myalgias.   Skin: Negative for rash and skin lesions.   Neurological: Negative for dizziness and headache.   Psychiatric/Behavioral: Positive for depressed mood and stress. Negative for self-injury, sleep disturbance and suicidal ideas. The patient is nervous/anxious.         History  Past Medical History:   Diagnosis Date   • Anxiety    • Depression    • Hypothyroidism       Past Surgical History:   Procedure Laterality Date   • KNEE SURGERY Bilateral     05/2013, 06/2013   • TONSILLECTOMY  04/2000   • WISDOM TOOTH EXTRACTION  11/2010      No Known Allergies   Family History    Problem Relation Age of Onset   • Other Mother    • Hypothyroidism Mother    • No Known Problems Father    • Hypothyroidism Sister    • Other Maternal Grandmother    • Hypothyroidism Maternal Grandmother    • Colon polyps Maternal Grandfather    • Breast cancer Paternal Grandmother 75   • Heart attack Paternal Grandfather    • Ovarian cancer Neg Hx    • Colon cancer Neg Hx       Social History     Socioeconomic History   • Marital status: Single   Tobacco Use   • Smoking status: Never Smoker   • Smokeless tobacco: Never Used   Vaping Use   • Vaping Use: Never used   Substance and Sexual Activity   • Alcohol use: Yes     Comment: Drink 1-2 times per month   • Drug use: No   • Sexual activity: Not Currently     Birth control/protection: Pill      Current Outpatient Medications on File Prior to Visit   Medication Sig Dispense Refill   • Cyanocobalamin (B-12) 1000 MCG capsule Take  by mouth.     • levothyroxine (SYNTHROID, LEVOTHROID) 100 MCG tablet Take 1 tablet by mouth Every Morning. On an empty stomach 90 tablet 3   • Lo Loestrin Fe 1 MG-10 MCG / 10 MCG tablet Take 1 tablet by mouth Daily. 84 tablet 3   • melatonin 5 MG tablet tablet Take 10 mg by mouth.     • [DISCONTINUED] sertraline (Zoloft) 50 MG tablet Take 1 tablet by mouth Daily. Take 0.5 tablet (25 mg) daily for 7 days, then 1 tablet (50 mg) daily 30 tablet 1     No current facility-administered medications on file prior to visit.       Results for orders placed or performed in visit on 08/15/22   Vitamin B12    Specimen: Blood   Result Value Ref Range    Vitamin B-12 1,888 (H) 211 - 946 pg/mL   Vitamin D 25 Hydroxy    Specimen: Blood   Result Value Ref Range    25 Hydroxy, Vitamin D 35.1 30.0 - 100.0 ng/ml   Comprehensive Metabolic Panel    Specimen: Blood   Result Value Ref Range    Glucose 79 65 - 99 mg/dL    BUN 7 6 - 20 mg/dL    Creatinine 0.78 0.57 - 1.00 mg/dL    Sodium 138 136 - 145 mmol/L    Potassium 4.6 3.5 - 5.2 mmol/L    Chloride 103 98 - 107  mmol/L    CO2 23.1 22.0 - 29.0 mmol/L    Calcium 9.6 8.6 - 10.5 mg/dL    Total Protein 7.2 6.0 - 8.5 g/dL    Albumin 4.50 3.50 - 5.20 g/dL    ALT (SGPT) 12 1 - 33 U/L    AST (SGOT) 14 1 - 32 U/L    Alkaline Phosphatase 43 39 - 117 U/L    Total Bilirubin 0.6 0.0 - 1.2 mg/dL    Globulin 2.7 gm/dL    A/G Ratio 1.7 g/dL    BUN/Creatinine Ratio 9.0 7.0 - 25.0    Anion Gap 11.9 5.0 - 15.0 mmol/L    eGFR 106.3 >60.0 mL/min/1.73   CBC Auto Differential    Specimen: Blood   Result Value Ref Range    WBC 7.28 3.40 - 10.80 10*3/mm3    RBC 4.53 3.77 - 5.28 10*6/mm3    Hemoglobin 13.2 12.0 - 15.9 g/dL    Hematocrit 40.3 34.0 - 46.6 %    MCV 89.0 79.0 - 97.0 fL    MCH 29.1 26.6 - 33.0 pg    MCHC 32.8 31.5 - 35.7 g/dL    RDW 12.6 12.3 - 15.4 %    RDW-SD 40.4 37.0 - 54.0 fl    MPV 9.8 6.0 - 12.0 fL    Platelets 325 140 - 450 10*3/mm3    Neutrophil % 64.1 42.7 - 76.0 %    Lymphocyte % 25.5 19.6 - 45.3 %    Monocyte % 8.1 5.0 - 12.0 %    Eosinophil % 1.8 0.3 - 6.2 %    Basophil % 0.4 0.0 - 1.5 %    Immature Grans % 0.1 0.0 - 0.5 %    Neutrophils, Absolute 4.66 1.70 - 7.00 10*3/mm3    Lymphocytes, Absolute 1.86 0.70 - 3.10 10*3/mm3    Monocytes, Absolute 0.59 0.10 - 0.90 10*3/mm3    Eosinophils, Absolute 0.13 0.00 - 0.40 10*3/mm3    Basophils, Absolute 0.03 0.00 - 0.20 10*3/mm3    Immature Grans, Absolute 0.01 0.00 - 0.05 10*3/mm3    nRBC 0.0 0.0 - 0.2 /100 WBC   Hepatitis C Antibody    Specimen: Blood   Result Value Ref Range    Hepatitis C Ab Non-Reactive Non-Reactive       Health Maintenance   Topic Date Due   • TDAP/TD VACCINES (1 - Tdap) Never done   • INFLUENZA VACCINE  10/01/2022   • ANNUAL PHYSICAL  09/27/2023   • PAP SMEAR  09/27/2024   • HEPATITIS C SCREENING  Completed   • COVID-19 Vaccine  Completed   • Pneumococcal Vaccine 0-64  Aged Out       Immunization History   Administered Date(s) Administered   • COVID-19 (MODERNA) 1st, 2nd, 3rd Dose Only 03/17/2021, 04/22/2021   • COVID-19 (MODERNA) BOOSTER 11/16/2021   • COVID-19  (PFIZER) BIVALENT BOOSTER 12+YRS 09/26/2022   • Flu Vaccine Split Quad 10/15/2018, 11/16/2021   • HPV Bivalent 10/12/2010       BMI is within normal parameters. No other follow-up for BMI required.      Results for orders placed or performed in visit on 08/15/22   Vitamin B12    Specimen: Blood   Result Value Ref Range    Vitamin B-12 1,888 (H) 211 - 946 pg/mL   Vitamin D 25 Hydroxy    Specimen: Blood   Result Value Ref Range    25 Hydroxy, Vitamin D 35.1 30.0 - 100.0 ng/ml   Comprehensive Metabolic Panel    Specimen: Blood   Result Value Ref Range    Glucose 79 65 - 99 mg/dL    BUN 7 6 - 20 mg/dL    Creatinine 0.78 0.57 - 1.00 mg/dL    Sodium 138 136 - 145 mmol/L    Potassium 4.6 3.5 - 5.2 mmol/L    Chloride 103 98 - 107 mmol/L    CO2 23.1 22.0 - 29.0 mmol/L    Calcium 9.6 8.6 - 10.5 mg/dL    Total Protein 7.2 6.0 - 8.5 g/dL    Albumin 4.50 3.50 - 5.20 g/dL    ALT (SGPT) 12 1 - 33 U/L    AST (SGOT) 14 1 - 32 U/L    Alkaline Phosphatase 43 39 - 117 U/L    Total Bilirubin 0.6 0.0 - 1.2 mg/dL    Globulin 2.7 gm/dL    A/G Ratio 1.7 g/dL    BUN/Creatinine Ratio 9.0 7.0 - 25.0    Anion Gap 11.9 5.0 - 15.0 mmol/L    eGFR 106.3 >60.0 mL/min/1.73   CBC Auto Differential    Specimen: Blood   Result Value Ref Range    WBC 7.28 3.40 - 10.80 10*3/mm3    RBC 4.53 3.77 - 5.28 10*6/mm3    Hemoglobin 13.2 12.0 - 15.9 g/dL    Hematocrit 40.3 34.0 - 46.6 %    MCV 89.0 79.0 - 97.0 fL    MCH 29.1 26.6 - 33.0 pg    MCHC 32.8 31.5 - 35.7 g/dL    RDW 12.6 12.3 - 15.4 %    RDW-SD 40.4 37.0 - 54.0 fl    MPV 9.8 6.0 - 12.0 fL    Platelets 325 140 - 450 10*3/mm3    Neutrophil % 64.1 42.7 - 76.0 %    Lymphocyte % 25.5 19.6 - 45.3 %    Monocyte % 8.1 5.0 - 12.0 %    Eosinophil % 1.8 0.3 - 6.2 %    Basophil % 0.4 0.0 - 1.5 %    Immature Grans % 0.1 0.0 - 0.5 %    Neutrophils, Absolute 4.66 1.70 - 7.00 10*3/mm3    Lymphocytes, Absolute 1.86 0.70 - 3.10 10*3/mm3    Monocytes, Absolute 0.59 0.10 - 0.90 10*3/mm3    Eosinophils, Absolute 0.13 0.00 -  "0.40 10*3/mm3    Basophils, Absolute 0.03 0.00 - 0.20 10*3/mm3    Immature Grans, Absolute 0.01 0.00 - 0.05 10*3/mm3    nRBC 0.0 0.0 - 0.2 /100 WBC   Hepatitis C Antibody    Specimen: Blood   Result Value Ref Range    Hepatitis C Ab Non-Reactive Non-Reactive            Vitals:    09/26/22 1449   BP: 118/70   BP Location: Left arm   Patient Position: Sitting   Cuff Size: Adult   Pulse: 110   Temp: 97.7 °F (36.5 °C)   SpO2: 97%   Weight: 81.8 kg (180 lb 6.4 oz)   Height: 182.9 cm (72.01\")   PainSc: 0-No pain       Body mass index is 24.46 kg/m².    Physical Exam  Vitals reviewed.   Constitutional:       General: She is not in acute distress.     Appearance: Normal appearance. She is well-developed and normal weight. She is not ill-appearing or diaphoretic.   HENT:      Head: Normocephalic and atraumatic.      Right Ear: Hearing, tympanic membrane, ear canal and external ear normal.      Left Ear: Hearing, tympanic membrane, ear canal and external ear normal.      Nose: Nose normal.      Right Sinus: No maxillary sinus tenderness or frontal sinus tenderness.      Left Sinus: No maxillary sinus tenderness or frontal sinus tenderness.      Mouth/Throat:      Pharynx: Uvula midline.   Eyes:      General: Lids are normal.      Extraocular Movements: Extraocular movements intact.      Conjunctiva/sclera: Conjunctivae normal.   Neck:      Thyroid: No thyroid mass or thyromegaly.      Trachea: Trachea and phonation normal.   Cardiovascular:      Rate and Rhythm: Normal rate and regular rhythm.      Heart sounds: Normal heart sounds.   Pulmonary:      Effort: Pulmonary effort is normal.      Breath sounds: Normal breath sounds.   Chest:      Comments: Fibrocystic breasts bilaterally.  No nipple inversion, tenderness or skin changes.  Abdominal:      General: Bowel sounds are normal. There is no distension.      Palpations: Abdomen is soft. Abdomen is not rigid.      Tenderness: There is no abdominal tenderness. There is no " guarding.   Musculoskeletal:         General: Normal range of motion.      Cervical back: Normal range of motion.      Right lower leg: No edema.      Left lower leg: No edema.   Lymphadenopathy:      Cervical: No cervical adenopathy.      Right cervical: No superficial cervical adenopathy.     Left cervical: No superficial cervical adenopathy.   Skin:     General: Skin is warm.      Findings: No erythema or rash.      Nails: There is no clubbing.   Neurological:      Mental Status: She is alert and oriented to person, place, and time.      Coordination: Coordination normal.      Gait: Gait normal.      Deep Tendon Reflexes: Reflexes are normal and symmetric.      Comments: CN grossly intact   Psychiatric:         Attention and Perception: Attention and perception normal. She is attentive.         Mood and Affect: Mood is anxious and depressed.         Speech: Speech normal.         Behavior: Behavior normal. Behavior is cooperative.         Thought Content: Thought content normal.         Cognition and Memory: Cognition and memory normal.         Judgment: Judgment normal.             Counseling provided on diet and nutrition, exercise, weight management, supplements, mental health concerns, anxiety and flu prevention.    Diagnoses and all orders for this visit:    1. Physical exam, annual (Primary)  PE completed  Preventative labs reviewed  Pap smear - up-to-date  Dentist-goes regularly  Ophthalmologist-encouraged to go regularly  Dermatologist-denies any abnormal moles or lesions  Covid, flu and Tdap recommended    2. Anxiety  3. Current moderate episode of major depressive disorder without prior episode (HCC)  -     sertraline (Zoloft) 100 MG tablet; Take 1 tablet by mouth Daily.  Dispense: 90 tablet; Refill: 1  Tolerating sertraline 50 mg well.  Has noticed improvement in mood and anxiety.  Still having anxiety.  Will trial increase to 100 mg daily.    4. Need for COVID-19 vaccine  -     COVID-19 Bivalent  Booster (Pfizer) 12+yrs       My Paul PA-C   9/26/2022   21:11 EDT

## 2022-10-08 DIAGNOSIS — F41.9 ANXIETY: ICD-10-CM

## 2022-10-08 DIAGNOSIS — F32.1 CURRENT MODERATE EPISODE OF MAJOR DEPRESSIVE DISORDER WITHOUT PRIOR EPISODE: ICD-10-CM

## 2022-10-10 NOTE — TELEPHONE ENCOUNTER
sertraline (ZOLOFT) 50 MG tablet     The original prescription was discontinued on 9/26/2022 by My Paul PA-C for the following reason: Reorder. Renewing this prescription may not be appropriate.     Dose increase to 100 mg daily 9/26/22

## 2022-10-27 ENCOUNTER — TELEMEDICINE (OUTPATIENT)
Dept: FAMILY MEDICINE CLINIC | Facility: CLINIC | Age: 27
End: 2022-10-27

## 2022-10-27 DIAGNOSIS — F41.9 ANXIETY: ICD-10-CM

## 2022-10-27 DIAGNOSIS — F32.1 CURRENT MODERATE EPISODE OF MAJOR DEPRESSIVE DISORDER WITHOUT PRIOR EPISODE: Primary | ICD-10-CM

## 2022-10-27 PROCEDURE — 99213 OFFICE O/P EST LOW 20 MIN: CPT | Performed by: PHYSICIAN ASSISTANT

## 2022-10-27 RX ORDER — BUPROPION HYDROCHLORIDE 150 MG/1
150 TABLET ORAL EVERY MORNING
Qty: 30 TABLET | Refills: 1 | Status: SHIPPED | OUTPATIENT
Start: 2022-10-27 | End: 2023-01-03 | Stop reason: SDUPTHER

## 2022-10-27 NOTE — PROGRESS NOTES
Chief Complaint   Patient presents with   • Anxiety   • Depression       Kelley Crews is a pleasant 27 y.o. female who is here for routine follow-up of anxiety and depression.  Patient was previously on Zoloft 50 mg daily and tolerating well.  She was having an increase in anxiety with work-related stress.  Her Zoloft was increased to 100 mg daily and she is tolerating this well.  She does notice some improvement with her anxiety with the increased dose.  She generally has more issues with depression.  She has also noticed some attention and focus issues.  Her sister is also on Zoloft but is also taking Wellbutrin with good benefit.  Patient is interested in trying this combination if possible.    Past Medical History:   Diagnosis Date   • Anxiety    • Depression    • Hypothyroidism        Past Surgical History:   Procedure Laterality Date   • KNEE SURGERY Bilateral     05/2013, 06/2013   • TONSILLECTOMY  04/2000   • WISDOM TOOTH EXTRACTION  11/2010       Family History   Problem Relation Age of Onset   • Other Mother    • Hypothyroidism Mother    • No Known Problems Father    • Hypothyroidism Sister    • Other Maternal Grandmother    • Hypothyroidism Maternal Grandmother    • Colon polyps Maternal Grandfather    • Breast cancer Paternal Grandmother 75   • Heart attack Paternal Grandfather    • Ovarian cancer Neg Hx    • Colon cancer Neg Hx        Social History     Socioeconomic History   • Marital status: Single   Tobacco Use   • Smoking status: Never   • Smokeless tobacco: Never   Vaping Use   • Vaping Use: Never used   Substance and Sexual Activity   • Alcohol use: Yes     Comment: Drink 1-2 times per month   • Drug use: No   • Sexual activity: Not Currently     Birth control/protection: Pill       No Known Allergies    ROS  Review of Systems   Psychiatric/Behavioral: Positive for agitation, decreased concentration, depressed mood and stress. Negative for self-injury, sleep disturbance and suicidal ideas. The  patient is nervous/anxious.        There were no vitals filed for this visit.  There is no height or weight on file to calculate BMI.    Current Outpatient Medications on File Prior to Visit   Medication Sig Dispense Refill   • levothyroxine (SYNTHROID, LEVOTHROID) 100 MCG tablet Take 1 tablet by mouth Every Morning. On an empty stomach 90 tablet 3   • Lo Loestrin Fe 1 MG-10 MCG / 10 MCG tablet Take 1 tablet by mouth Daily. 84 tablet 3   • melatonin 5 MG tablet tablet Take 10 mg by mouth.     • sertraline (Zoloft) 100 MG tablet Take 1 tablet by mouth Daily. 90 tablet 1   • [DISCONTINUED] Cyanocobalamin (B-12) 1000 MCG capsule Take  by mouth.       No current facility-administered medications on file prior to visit.       Results for orders placed or performed in visit on 08/15/22   Vitamin B12    Specimen: Blood   Result Value Ref Range    Vitamin B-12 1,888 (H) 211 - 946 pg/mL   Vitamin D 25 Hydroxy    Specimen: Blood   Result Value Ref Range    25 Hydroxy, Vitamin D 35.1 30.0 - 100.0 ng/ml   Comprehensive Metabolic Panel    Specimen: Blood   Result Value Ref Range    Glucose 79 65 - 99 mg/dL    BUN 7 6 - 20 mg/dL    Creatinine 0.78 0.57 - 1.00 mg/dL    Sodium 138 136 - 145 mmol/L    Potassium 4.6 3.5 - 5.2 mmol/L    Chloride 103 98 - 107 mmol/L    CO2 23.1 22.0 - 29.0 mmol/L    Calcium 9.6 8.6 - 10.5 mg/dL    Total Protein 7.2 6.0 - 8.5 g/dL    Albumin 4.50 3.50 - 5.20 g/dL    ALT (SGPT) 12 1 - 33 U/L    AST (SGOT) 14 1 - 32 U/L    Alkaline Phosphatase 43 39 - 117 U/L    Total Bilirubin 0.6 0.0 - 1.2 mg/dL    Globulin 2.7 gm/dL    A/G Ratio 1.7 g/dL    BUN/Creatinine Ratio 9.0 7.0 - 25.0    Anion Gap 11.9 5.0 - 15.0 mmol/L    eGFR 106.3 >60.0 mL/min/1.73   CBC Auto Differential    Specimen: Blood   Result Value Ref Range    WBC 7.28 3.40 - 10.80 10*3/mm3    RBC 4.53 3.77 - 5.28 10*6/mm3    Hemoglobin 13.2 12.0 - 15.9 g/dL    Hematocrit 40.3 34.0 - 46.6 %    MCV 89.0 79.0 - 97.0 fL    MCH 29.1 26.6 - 33.0 pg     MCHC 32.8 31.5 - 35.7 g/dL    RDW 12.6 12.3 - 15.4 %    RDW-SD 40.4 37.0 - 54.0 fl    MPV 9.8 6.0 - 12.0 fL    Platelets 325 140 - 450 10*3/mm3    Neutrophil % 64.1 42.7 - 76.0 %    Lymphocyte % 25.5 19.6 - 45.3 %    Monocyte % 8.1 5.0 - 12.0 %    Eosinophil % 1.8 0.3 - 6.2 %    Basophil % 0.4 0.0 - 1.5 %    Immature Grans % 0.1 0.0 - 0.5 %    Neutrophils, Absolute 4.66 1.70 - 7.00 10*3/mm3    Lymphocytes, Absolute 1.86 0.70 - 3.10 10*3/mm3    Monocytes, Absolute 0.59 0.10 - 0.90 10*3/mm3    Eosinophils, Absolute 0.13 0.00 - 0.40 10*3/mm3    Basophils, Absolute 0.03 0.00 - 0.20 10*3/mm3    Immature Grans, Absolute 0.01 0.00 - 0.05 10*3/mm3    nRBC 0.0 0.0 - 0.2 /100 WBC   Hepatitis C Antibody    Specimen: Blood   Result Value Ref Range    Hepatitis C Ab Non-Reactive Non-Reactive       PE    Physical Exam  Vitals reviewed.   Constitutional:       General: She is not in acute distress.     Appearance: Normal appearance. She is well-developed and normal weight. She is not ill-appearing or diaphoretic.   HENT:      Head: Normocephalic and atraumatic.   Eyes:      Extraocular Movements: Extraocular movements intact.      Conjunctiva/sclera: Conjunctivae normal.   Pulmonary:      Effort: No respiratory distress.   Musculoskeletal:         General: Normal range of motion.      Cervical back: Normal range of motion.   Neurological:      General: No focal deficit present.      Mental Status: She is alert.   Psychiatric:         Attention and Perception: Attention and perception normal. She is attentive.         Mood and Affect: Mood and affect normal.         Speech: Speech normal.         Behavior: Behavior normal. Behavior is cooperative.         Thought Content: Thought content normal.         Cognition and Memory: Cognition and memory normal.         Judgment: Judgment normal.         A/P    Diagnoses and all orders for this visit:    1. Current moderate episode of major depressive disorder without prior episode (HCC)  (Primary)  2. Anxiety  -     buPROPion XL (Wellbutrin XL) 150 MG 24 hr tablet; Take 1 tablet by mouth Every Morning.  Dispense: 30 tablet; Refill: 1  Patient is currently taking Zoloft 100 mg daily and doing well.  She reports improvement in her anxiety with the increased dose of Zoloft.  She is interested in also trying Wellbutrin to help benefit her depression.  We will send in a prescription for 150 mg daily.  Return in 4 to 6 weeks for video visit.  Call sooner if she has any concerns or complaints.    You have chosen to receive care through a telehealth visit.  Do you consent to use a video/audio connection for your medical care today? Yes.  Patient is at home in [place], provider is in the office in [place].           Plan of care reviewed with patient at the conclusion of today's visit. Education was provided regarding diagnosis, management and any prescribed or recommended OTC medications.  Patient verbalizes understanding of and agreement with management plan.    Dictated Utilizing Dragon Dictation     Please note that portions of this note were completed with a voice recognition program.     Part of this note may be an electronic transcription/translation of spoken language to printed text using the Dragon Dictation System.    Return in about 6 weeks (around 12/8/2022) for Recheck, anxiety/depression.     My Paul PA-C

## 2022-11-03 ENCOUNTER — TELEPHONE (OUTPATIENT)
Dept: ENDOCRINOLOGY | Facility: CLINIC | Age: 27
End: 2022-11-03

## 2022-11-03 DIAGNOSIS — E06.3 HYPOTHYROIDISM DUE TO HASHIMOTO'S THYROIDITIS: ICD-10-CM

## 2022-11-03 DIAGNOSIS — E03.8 HYPOTHYROIDISM DUE TO HASHIMOTO'S THYROIDITIS: ICD-10-CM

## 2022-11-03 RX ORDER — LEVOTHYROXINE SODIUM 0.1 MG/1
100 TABLET ORAL EVERY MORNING
Qty: 90 TABLET | Refills: 3 | Status: SHIPPED | OUTPATIENT
Start: 2022-11-03

## 2022-11-03 NOTE — TELEPHONE ENCOUNTER
Pt called and stated she switched to [facility] pharmacy. She needs synthroid 100mcg daily sent there: Phone: [phone] Fax: [phone]    Please call pt with questions: [phone]

## 2023-01-03 DIAGNOSIS — F32.1 CURRENT MODERATE EPISODE OF MAJOR DEPRESSIVE DISORDER WITHOUT PRIOR EPISODE: ICD-10-CM

## 2023-01-03 RX ORDER — BUPROPION HYDROCHLORIDE 150 MG/1
150 TABLET ORAL EVERY MORNING
Qty: 30 TABLET | Refills: 0 | Status: SHIPPED | OUTPATIENT
Start: 2023-01-03 | End: 2023-01-31 | Stop reason: SDUPTHER

## 2023-01-03 NOTE — TELEPHONE ENCOUNTER
Rx Refill Note  Requested Prescriptions     Pending Prescriptions Disp Refills   • buPROPion XL (Wellbutrin XL) 150 MG 24 hr tablet 30 tablet 1     Sig: Take 1 tablet by mouth Every Morning.      Last office visit with prescribing clinician: 9/26/2022   Last telemedicine visit with prescribing clinician: 10/27/2022   Next office visit with prescribing clinician: Visit date not found                         Would you like a call back once the refill request has been completed: [] Yes [] No    If the office needs to give you a call back, can they leave a voicemail: [] Yes [] No    Bairon Unger MA  01/03/23, 16:19 EST

## 2023-01-31 ENCOUNTER — TELEMEDICINE (OUTPATIENT)
Dept: FAMILY MEDICINE CLINIC | Facility: CLINIC | Age: 28
End: 2023-01-31
Payer: COMMERCIAL

## 2023-01-31 ENCOUNTER — PATIENT MESSAGE (OUTPATIENT)
Dept: FAMILY MEDICINE CLINIC | Facility: CLINIC | Age: 28
End: 2023-01-31

## 2023-01-31 DIAGNOSIS — R41.840 ATTENTION AND CONCENTRATION DEFICIT: ICD-10-CM

## 2023-01-31 DIAGNOSIS — F41.9 ANXIETY: ICD-10-CM

## 2023-01-31 DIAGNOSIS — F32.0 CURRENT MILD EPISODE OF MAJOR DEPRESSIVE DISORDER WITHOUT PRIOR EPISODE: Primary | ICD-10-CM

## 2023-01-31 PROCEDURE — 99213 OFFICE O/P EST LOW 20 MIN: CPT | Performed by: PHYSICIAN ASSISTANT

## 2023-01-31 RX ORDER — BUPROPION HYDROCHLORIDE 300 MG/1
300 TABLET ORAL EVERY MORNING
Qty: 30 TABLET | Refills: 5 | Status: SHIPPED | OUTPATIENT
Start: 2023-01-31 | End: 2023-03-16 | Stop reason: SDUPTHER

## 2023-01-31 RX ORDER — SERTRALINE HYDROCHLORIDE 100 MG/1
100 TABLET, FILM COATED ORAL DAILY
Qty: 90 TABLET | Refills: 1 | Status: SHIPPED | OUTPATIENT
Start: 2023-01-31 | End: 2023-03-16 | Stop reason: SDUPTHER

## 2023-01-31 NOTE — PROGRESS NOTES
Chief Complaint   Patient presents with   • Anxiety   • Depression       Kelley Crews is a pleasant 27 y.o. female who is here for anxiety and depression.  Patient recently started Wellbutrin 150 mg daily.  She reports improvement in her depression.  She is tolerating medication well.  Is still taking Zoloft 100 mg and this works well for her anxiety.  She continues to have issues with attention and focus.    Past Medical History:   Diagnosis Date   • Anxiety    • Depression    • Hypothyroidism        Past Surgical History:   Procedure Laterality Date   • KNEE SURGERY Bilateral     05/2013, 06/2013   • TONSILLECTOMY  04/2000   • WISDOM TOOTH EXTRACTION  11/2010       Family History   Problem Relation Age of Onset   • Other Mother    • Hypothyroidism Mother    • No Known Problems Father    • Hypothyroidism Sister    • Other Maternal Grandmother    • Hypothyroidism Maternal Grandmother    • Colon polyps Maternal Grandfather    • Breast cancer Paternal Grandmother 75   • Heart attack Paternal Grandfather    • Ovarian cancer Neg Hx    • Colon cancer Neg Hx        Social History     Socioeconomic History   • Marital status: Single   Tobacco Use   • Smoking status: Never   • Smokeless tobacco: Never   Vaping Use   • Vaping Use: Never used   Substance and Sexual Activity   • Alcohol use: Yes     Comment: Drink 1-2 times per month   • Drug use: No   • Sexual activity: Not Currently     Birth control/protection: Pill       No Known Allergies    ROS  Review of Systems   Psychiatric/Behavioral: Positive for decreased concentration, depressed mood and stress. Negative for sleep disturbance and suicidal ideas. The patient is nervous/anxious.        There were no vitals filed for this visit.  There is no height or weight on file to calculate BMI.    Current Outpatient Medications on File Prior to Visit   Medication Sig Dispense Refill   • levothyroxine (SYNTHROID, LEVOTHROID) 100 MCG tablet Take 1 tablet by mouth Every  Morning. On an empty stomach 90 tablet 3   • Lo Loestrin Fe 1 MG-10 MCG / 10 MCG tablet Take 1 tablet by mouth Daily. 84 tablet 3   • melatonin 5 MG tablet tablet Take 10 mg by mouth.     • [DISCONTINUED] buPROPion XL (Wellbutrin XL) 150 MG 24 hr tablet Take 1 tablet by mouth Every Morning. 30 tablet 0   • [DISCONTINUED] sertraline (Zoloft) 100 MG tablet Take 1 tablet by mouth Daily. 90 tablet 1     No current facility-administered medications on file prior to visit.       Results for orders placed or performed in visit on 08/15/22   Vitamin B12    Specimen: Blood   Result Value Ref Range    Vitamin B-12 1,888 (H) 211 - 946 pg/mL   Vitamin D 25 Hydroxy    Specimen: Blood   Result Value Ref Range    25 Hydroxy, Vitamin D 35.1 30.0 - 100.0 ng/ml   Comprehensive Metabolic Panel    Specimen: Blood   Result Value Ref Range    Glucose 79 65 - 99 mg/dL    BUN 7 6 - 20 mg/dL    Creatinine 0.78 0.57 - 1.00 mg/dL    Sodium 138 136 - 145 mmol/L    Potassium 4.6 3.5 - 5.2 mmol/L    Chloride 103 98 - 107 mmol/L    CO2 23.1 22.0 - 29.0 mmol/L    Calcium 9.6 8.6 - 10.5 mg/dL    Total Protein 7.2 6.0 - 8.5 g/dL    Albumin 4.50 3.50 - 5.20 g/dL    ALT (SGPT) 12 1 - 33 U/L    AST (SGOT) 14 1 - 32 U/L    Alkaline Phosphatase 43 39 - 117 U/L    Total Bilirubin 0.6 0.0 - 1.2 mg/dL    Globulin 2.7 gm/dL    A/G Ratio 1.7 g/dL    BUN/Creatinine Ratio 9.0 7.0 - 25.0    Anion Gap 11.9 5.0 - 15.0 mmol/L    eGFR 106.3 >60.0 mL/min/1.73   CBC Auto Differential    Specimen: Blood   Result Value Ref Range    WBC 7.28 3.40 - 10.80 10*3/mm3    RBC 4.53 3.77 - 5.28 10*6/mm3    Hemoglobin 13.2 12.0 - 15.9 g/dL    Hematocrit 40.3 34.0 - 46.6 %    MCV 89.0 79.0 - 97.0 fL    MCH 29.1 26.6 - 33.0 pg    MCHC 32.8 31.5 - 35.7 g/dL    RDW 12.6 12.3 - 15.4 %    RDW-SD 40.4 37.0 - 54.0 fl    MPV 9.8 6.0 - 12.0 fL    Platelets 325 140 - 450 10*3/mm3    Neutrophil % 64.1 42.7 - 76.0 %    Lymphocyte % 25.5 19.6 - 45.3 %    Monocyte % 8.1 5.0 - 12.0 %     Eosinophil % 1.8 0.3 - 6.2 %    Basophil % 0.4 0.0 - 1.5 %    Immature Grans % 0.1 0.0 - 0.5 %    Neutrophils, Absolute 4.66 1.70 - 7.00 10*3/mm3    Lymphocytes, Absolute 1.86 0.70 - 3.10 10*3/mm3    Monocytes, Absolute 0.59 0.10 - 0.90 10*3/mm3    Eosinophils, Absolute 0.13 0.00 - 0.40 10*3/mm3    Basophils, Absolute 0.03 0.00 - 0.20 10*3/mm3    Immature Grans, Absolute 0.01 0.00 - 0.05 10*3/mm3    nRBC 0.0 0.0 - 0.2 /100 WBC   Hepatitis C Antibody    Specimen: Blood   Result Value Ref Range    Hepatitis C Ab Non-Reactive Non-Reactive       PE    Physical Exam  Vitals reviewed.   Constitutional:       General: She is not in acute distress.     Appearance: Normal appearance. She is well-developed. She is not ill-appearing or diaphoretic.   HENT:      Head: Normocephalic and atraumatic.   Eyes:      Extraocular Movements: Extraocular movements intact.      Conjunctiva/sclera: Conjunctivae normal.   Pulmonary:      Effort: No respiratory distress.   Musculoskeletal:         General: Normal range of motion.      Cervical back: Normal range of motion.   Neurological:      General: No focal deficit present.      Mental Status: She is alert.   Psychiatric:         Attention and Perception: Attention and perception normal. She is attentive.         Mood and Affect: Mood and affect normal.         Speech: Speech normal.         Behavior: Behavior normal. Behavior is cooperative.         Thought Content: Thought content normal.         Cognition and Memory: Cognition and memory normal.         Judgment: Judgment normal.         A/P    Diagnoses and all orders for this visit:    1. Current mild episode of major depressive disorder without prior episode (HCC) (Primary)  -     buPROPion XL (Wellbutrin XL) 300 MG 24 hr tablet; Take 1 tablet by mouth Every Morning.  Dispense: 30 tablet; Refill: 5  -     sertraline (Zoloft) 100 MG tablet; Take 1 tablet by mouth Daily.  Dispense: 90 tablet; Refill: 1    2. Anxiety  -     sertraline  (Zoloft) 100 MG tablet; Take 1 tablet by mouth Daily.  Dispense: 90 tablet; Refill: 1    3. Attention and concentration deficit  -     buPROPion XL (Wellbutrin XL) 300 MG 24 hr tablet; Take 1 tablet by mouth Every Morning.  Dispense: 30 tablet; Refill: 5    Doing well on Wellbutrin 150 mg and Zoloft 100 mg daily.  Patient is still having issues with attention and focus.  Will trail increase in Wellbutrin to 300 mg daily.  Follow-up in 6 weeks.       Plan of care reviewed with patient at the conclusion of today's visit. Education was provided regarding diagnosis, management and any prescribed or recommended OTC medications.  Patient verbalizes understanding of and agreement with management plan.    Dictated Utilizing Dragon Dictation     Please note that portions of this note were completed with a voice recognition program.     Part of this note may be an electronic transcription/translation of spoken language to printed text using the Dragon Dictation System.    Return in about 6 weeks (around 3/14/2023) for Recheck, anxiety/depression.     My Paul PA-C

## 2023-02-17 ENCOUNTER — TELEPHONE (OUTPATIENT)
Dept: FAMILY MEDICINE CLINIC | Facility: CLINIC | Age: 28
End: 2023-02-17
Payer: COMMERCIAL

## 2023-02-17 NOTE — TELEPHONE ENCOUNTER
Spoke with patient and let her know I've submitted labs from August visit with the preventative code to the billing department to resubmit her claim.

## 2023-03-16 ENCOUNTER — TELEMEDICINE (OUTPATIENT)
Dept: FAMILY MEDICINE CLINIC | Facility: CLINIC | Age: 28
End: 2023-03-16
Payer: COMMERCIAL

## 2023-03-16 DIAGNOSIS — E03.8 HYPOTHYROIDISM DUE TO HASHIMOTO'S THYROIDITIS: ICD-10-CM

## 2023-03-16 DIAGNOSIS — E06.3 HYPOTHYROIDISM DUE TO HASHIMOTO'S THYROIDITIS: ICD-10-CM

## 2023-03-16 DIAGNOSIS — F32.0 CURRENT MILD EPISODE OF MAJOR DEPRESSIVE DISORDER WITHOUT PRIOR EPISODE: ICD-10-CM

## 2023-03-16 DIAGNOSIS — F41.9 ANXIETY: ICD-10-CM

## 2023-03-16 DIAGNOSIS — R41.840 ATTENTION AND CONCENTRATION DEFICIT: Primary | ICD-10-CM

## 2023-03-16 PROCEDURE — 99214 OFFICE O/P EST MOD 30 MIN: CPT | Performed by: PHYSICIAN ASSISTANT

## 2023-03-16 RX ORDER — SERTRALINE HYDROCHLORIDE 100 MG/1
100 TABLET, FILM COATED ORAL DAILY
Qty: 90 TABLET | Refills: 3 | Status: SHIPPED | OUTPATIENT
Start: 2023-03-16

## 2023-03-16 RX ORDER — BUPROPION HYDROCHLORIDE 300 MG/1
300 TABLET ORAL EVERY MORNING
Qty: 90 TABLET | Refills: 3 | Status: SHIPPED | OUTPATIENT
Start: 2023-03-16

## 2023-03-16 NOTE — PROGRESS NOTES
Chief Complaint   Patient presents with   • Attention and Focus       Kelley Crews is a pleasant 27 y.o. female who is here for routine follow-up of attention and focus.  Patient is taking Wellbutrin 300 mg daily.  She reports improvement in her attention and focus with medication.  She has also noticed improvement in depression.  Her depression and anxiety are stable with the combination of Wellbutrin and Zoloft.  She denies any adverse effects.    She is also compliant on levothyroxine.  Has been on same dose for awhile.  Will be transferring prescription management to our office.    Past Medical History:   Diagnosis Date   • Anxiety    • Depression    • Hypothyroidism        Past Surgical History:   Procedure Laterality Date   • KNEE SURGERY Bilateral     05/2013, 06/2013   • TONSILLECTOMY  04/2000   • WISDOM TOOTH EXTRACTION  11/2010       Family History   Problem Relation Age of Onset   • Other Mother    • Hypothyroidism Mother    • No Known Problems Father    • Hypothyroidism Sister    • Other Maternal Grandmother    • Hypothyroidism Maternal Grandmother    • Colon polyps Maternal Grandfather    • Breast cancer Paternal Grandmother 75   • Heart attack Paternal Grandfather    • Ovarian cancer Neg Hx    • Colon cancer Neg Hx        Social History     Socioeconomic History   • Marital status: Single   Tobacco Use   • Smoking status: Never   • Smokeless tobacco: Never   Vaping Use   • Vaping Use: Never used   Substance and Sexual Activity   • Alcohol use: Yes     Comment: Drink 1-2 times per month   • Drug use: No   • Sexual activity: Not Currently     Birth control/protection: Pill       No Known Allergies    ROS  Review of Systems   Constitutional: Negative for chills and fever.   Psychiatric/Behavioral: Positive for decreased concentration and stress. Negative for agitation, sleep disturbance and depressed mood. The patient is not nervous/anxious.        There were no vitals filed for this visit.  There is  no height or weight on file to calculate BMI.    Current Outpatient Medications on File Prior to Visit   Medication Sig Dispense Refill   • levothyroxine (SYNTHROID, LEVOTHROID) 100 MCG tablet Take 1 tablet by mouth Every Morning. On an empty stomach 90 tablet 3   • Lo Loestrin Fe 1 MG-10 MCG / 10 MCG tablet Take 1 tablet by mouth Daily. 84 tablet 3   • melatonin 5 MG tablet tablet Take 10 mg by mouth.     • [DISCONTINUED] buPROPion XL (Wellbutrin XL) 300 MG 24 hr tablet Take 1 tablet by mouth Every Morning. 30 tablet 5   • [DISCONTINUED] sertraline (Zoloft) 100 MG tablet Take 1 tablet by mouth Daily. 90 tablet 1     No current facility-administered medications on file prior to visit.       Results for orders placed or performed in visit on 08/15/22   Vitamin B12    Specimen: Blood   Result Value Ref Range    Vitamin B-12 1,888 (H) 211 - 946 pg/mL   Vitamin D 25 Hydroxy    Specimen: Blood   Result Value Ref Range    25 Hydroxy, Vitamin D 35.1 30.0 - 100.0 ng/ml   Comprehensive Metabolic Panel    Specimen: Blood   Result Value Ref Range    Glucose 79 65 - 99 mg/dL    BUN 7 6 - 20 mg/dL    Creatinine 0.78 0.57 - 1.00 mg/dL    Sodium 138 136 - 145 mmol/L    Potassium 4.6 3.5 - 5.2 mmol/L    Chloride 103 98 - 107 mmol/L    CO2 23.1 22.0 - 29.0 mmol/L    Calcium 9.6 8.6 - 10.5 mg/dL    Total Protein 7.2 6.0 - 8.5 g/dL    Albumin 4.50 3.50 - 5.20 g/dL    ALT (SGPT) 12 1 - 33 U/L    AST (SGOT) 14 1 - 32 U/L    Alkaline Phosphatase 43 39 - 117 U/L    Total Bilirubin 0.6 0.0 - 1.2 mg/dL    Globulin 2.7 gm/dL    A/G Ratio 1.7 g/dL    BUN/Creatinine Ratio 9.0 7.0 - 25.0    Anion Gap 11.9 5.0 - 15.0 mmol/L    eGFR 106.3 >60.0 mL/min/1.73   CBC Auto Differential    Specimen: Blood   Result Value Ref Range    WBC 7.28 3.40 - 10.80 10*3/mm3    RBC 4.53 3.77 - 5.28 10*6/mm3    Hemoglobin 13.2 12.0 - 15.9 g/dL    Hematocrit 40.3 34.0 - 46.6 %    MCV 89.0 79.0 - 97.0 fL    MCH 29.1 26.6 - 33.0 pg    MCHC 32.8 31.5 - 35.7 g/dL    RDW  12.6 12.3 - 15.4 %    RDW-SD 40.4 37.0 - 54.0 fl    MPV 9.8 6.0 - 12.0 fL    Platelets 325 140 - 450 10*3/mm3    Neutrophil % 64.1 42.7 - 76.0 %    Lymphocyte % 25.5 19.6 - 45.3 %    Monocyte % 8.1 5.0 - 12.0 %    Eosinophil % 1.8 0.3 - 6.2 %    Basophil % 0.4 0.0 - 1.5 %    Immature Grans % 0.1 0.0 - 0.5 %    Neutrophils, Absolute 4.66 1.70 - 7.00 10*3/mm3    Lymphocytes, Absolute 1.86 0.70 - 3.10 10*3/mm3    Monocytes, Absolute 0.59 0.10 - 0.90 10*3/mm3    Eosinophils, Absolute 0.13 0.00 - 0.40 10*3/mm3    Basophils, Absolute 0.03 0.00 - 0.20 10*3/mm3    Immature Grans, Absolute 0.01 0.00 - 0.05 10*3/mm3    nRBC 0.0 0.0 - 0.2 /100 WBC   Hepatitis C Antibody    Specimen: Blood   Result Value Ref Range    Hepatitis C Ab Non-Reactive Non-Reactive       PE    Physical Exam  Vitals reviewed.   Constitutional:       General: She is not in acute distress.     Appearance: Normal appearance. She is well-developed and normal weight. She is not ill-appearing or diaphoretic.   HENT:      Head: Normocephalic and atraumatic.   Eyes:      Extraocular Movements: Extraocular movements intact.      Conjunctiva/sclera: Conjunctivae normal.   Pulmonary:      Effort: No respiratory distress.   Musculoskeletal:         General: Normal range of motion.      Cervical back: Normal range of motion.   Neurological:      General: No focal deficit present.      Mental Status: She is alert.   Psychiatric:         Attention and Perception: Attention and perception normal. She is attentive.         Mood and Affect: Mood and affect normal.         Speech: Speech normal.         Behavior: Behavior normal. Behavior is cooperative.         Thought Content: Thought content normal.         Cognition and Memory: Cognition and memory normal.         Judgment: Judgment normal.         A/P    Diagnoses and all orders for this visit:    1. Attention and concentration deficit (Primary)  -     buPROPion XL (Wellbutrin XL) 300 MG 24 hr tablet; Take 1 tablet  by mouth Every Morning.  Dispense: 90 tablet; Refill: 3  Reports improvement in attention and focus with medication.  Tolerating well with no adverse effects.    2. Current mild episode of major depressive disorder without prior episode (HCC)  -     buPROPion XL (Wellbutrin XL) 300 MG 24 hr tablet; Take 1 tablet by mouth Every Morning.  Dispense: 90 tablet; Refill: 3  -     sertraline (Zoloft) 100 MG tablet; Take 1 tablet by mouth Daily.  Dispense: 90 tablet; Refill: 3  Improvement in depression with increase in Wellbutrin.    3. Anxiety  -     sertraline (Zoloft) 100 MG tablet; Take 1 tablet by mouth Daily.  Dispense: 90 tablet; Refill: 3  Stable anxiety and depression with current medication regiment.    4. Hypothyroidism due to Hashimoto's thyroiditis  On Levothyroxine 100 mcg daily.  Will be transferring management to our office since she is stable on medication.    You have chosen to receive care through a telehealth visit.  Do you consent to use a video/audio connection for your medical care today? Yes.  Patient is in [place], Provider is in [place].       Plan of care reviewed with patient at the conclusion of today's visit. Education was provided regarding diagnosis, management and any prescribed or recommended OTC medications.  Patient verbalizes understanding of and agreement with management plan.    Dictated Utilizing Dragon Dictation     Please note that portions of this note were completed with a voice recognition program.     Part of this note may be an electronic transcription/translation of spoken language to printed text using the Dragon Dictation System.    Return in about 6 months (around 9/27/2023) for Annual physical.     My Paul PA-C

## 2023-06-14 NOTE — TELEPHONE ENCOUNTER
Caller: Theodore Crewsney    Relationship: Self    Best call back number: [phone]    Requested Prescriptions:   Requested Prescriptions     Pending Prescriptions Disp Refills   • buPROPion XL (Wellbutrin XL) 150 MG 24 hr tablet 30 tablet 1     Sig: Take 1 tablet by mouth Every Morning.        Pharmacy where request should be sent: [facility] [id number] [address] - [phone]  - [phone] FX     Additional details provided by patient: SHE TOOK HER LAST DOSE TODAY    Does the patient have less than a 3 day supply:  [x] Yes  [] No    Would you like a call back once the refill request has been completed: [x] Yes [] No    If the office needs to give you a call back, can they leave a voicemail: [] Yes [x] No VM NOT WORKING    Reuben Orellana, PCT   01/03/23 14:49 EST    [facility] ENCOUN[facility]    23 12:43 AM    CHIEF COMPLAINT   Chief Complaint   Patient presents with   • Vaginal Bleed- Pregnant   • Abdominal Pain Pregnant       HPI  Natalie Ku is a 33 year old female who is , presenting with complaint of lower abdominal pain and vaginal bleeding for the last week, she is about 6 weeks by dates per her account.  No fevers or chills.  No urinary symptoms.  Patient is O-positive blood type.  No complications with previous pregnancy.      EXTERNAL CHART REVIEW      ALLERGIES  ALLERGIES:  No Known Allergies    CURRENT MEDICATIONS    No current facility-administered medications for this encounter.     No current outpatient medications on file.       PAST MEDICAL HISTORY    No past medical history on file.    SURGICAL HISTORY    No past surgical history on file.    SOCIAL HISTORY         FAMILY HISTORY    No family history on file.    REVIEW OF SYSTEMS    See HPI    PHYSICAL EXAM    Vitals:    23 2356 23 0105 23 0221 23 0230   BP:  126/80 137/80 129/73   BP Location:       Patient Position:       Pulse:  72 84 82   Resp:  18 16 16   Temp:       TempSrc:       SpO2: 98% 99% 99% 99%   Weight:       Height:       LMP: 2023       Patient is awake and alert and does not appear to be in distress   Head is atraumatic normocephalic   Neck is supple   Heart is regular rate and rhythm without murmurs rubs gallops  Lungs are clear to auscultation bilaterally with normal work of breathing  Abdomen is soft, has some very mild bilateral lower abdominal tenderness without rebound or guarding, normal bowel sounds   Fully alert oriented without focal neurological deficits         RADIOLOGY    US OB LESS THAN 14 WEEKS FIRST TRIMESTER SINGLE FETUS   Final Result   IMPRESSION:       1. Possible very small intrauterine gestational sac within the fundal   aspect of the endometrial canal. If this does represent a gestational sac,   estimated  gestational age is 5 weeks, 0 days. No fetal pole or yolk sac is   identified.   2. Right ovarian corpus luteum. Otherwise normal ovaries.      Electronically Signed by: Juan Kumar MD    Signed on: 6/14/2023 2:30 AM    Workstation ID: [id number]          LABS    Results for orders placed or performed during the hospital encounter of 06/14/23   Basic Metabolic Panel   Result Value    Fasting Status     Sodium 138    Potassium 3.9    Chloride 102    Carbon Dioxide 27    Anion Gap 13    Glucose 102 (H)    BUN 11    Creatinine 0.79    Glomerular Filtration Rate >90     Comment: eGFR results = or >60 mL/min/1.73m2 = Normal kidney function. Estimated GFR calculated using the CKD-EPI-R (2021) equation that does not include race in the creatinine calculation.    BUN/Cr 14    Calcium 9.2   Beta HCG Quantitative Pregnancy   Result Value    HCG, Quantitative 940 (H)     Comment:   Gestational age     Expected hCG (mUnits/mL)  0.2 to 1 week         5 to 50  1 to 2 weeks          50 to 500  2 to 3 weeks          100 to 5,000  3 to 4 weeks          500 to 10,000  4 to 5 weeks          1,000 to 50,000  5 to 6 weeks          10,000 to 100,000  6 to 8 weeks          15,000 to 200,000  2 to 3 months         10,000 to 100,000    Non pregnant premenopausal  <=40 years      <5 mUnits/mL  Perimenopausal              41 to 55 years  <8 mUnits/mL  Postmenopausal              >55 years       <14 mUnits/mL   CBC with Automated Differential (performable only)   Result Value    WBC 9.9    RBC 4.03    HGB 12.7    HCT 37.0    MCV 91.8    MCH 31.5    MCHC 34.3    RDW-CV 12.3    RDW-SD 41.7        NRBC 0    Neutrophil, Percent 64    Lymphocytes, Percent 29    Mono, Percent 6    Eosinophils, Percent 1    Basophils, Percent 0    Immature Granulocytes 0    Absolute Neutrophils 6.2    Absolute Lymphocytes 2.9    Absolute Monocytes 0.6    Absolute Eosinophils  0.1    Absolute Basophils 0.0    Absolute Immature Granulocytes 0.0   POCT Urine  pregnancy   Result Value    URINE PREGNANCY,QUAL Positive (A)    Internal Procedural Controls Acceptable Yes   ABO/RH GROUP AND TYPE (D)   Result Value    ABO/RH(D) O Rh Positive       ED MEDICATIONS      ED Medication Orders (From admission, onward)    None          PROCEDURES    Procedures None    Decision Making Tools:            MEDICAL DECISION MAKING/ED COURSE  Natalie Ku is a 33 year old female c/o   Chief Complaint   Patient presents with   • Vaginal Bleed- Pregnant   • Abdominal Pain Pregnant       33-year-old female with possible gestational sac on pelvic ultrasound, beta-hCG is low, patient is Rh positive, discussed the possibility of abnormal pregnancy versus early verses indeterminate and potentially still unknown location, stressed importance of follow-up within 48 hours for repeat beta HCG      ED COURSE         Impression:  The encounter diagnosis was First trimester bleeding.    Follow Up:  No follow-up provider specified.     There are no discharge medications for this patient.       Summary of your Discharge Medications      You have not been prescribed any medications.             Bakari Hernandez MD  Emergency Medicine  06/16/23               Bakari Hernandez MD  06/16/23 [id number]

## 2023-08-15 DIAGNOSIS — Z30.41 ENCOUNTER FOR SURVEILLANCE OF CONTRACEPTIVE PILLS: ICD-10-CM

## 2023-08-15 NOTE — TELEPHONE ENCOUNTER
Caller: Kelley Crews    Relationship: Self    Best call back number: [phone]     Requested Prescriptions:   Requested Prescriptions     Pending Prescriptions Disp Refills    Lo Loestrin Fe 1 MG-10 MCG / 10 MCG tablet 84 tablet 3     Sig: Take 1 tablet by mouth Daily.        Pharmacy where request should be sent: [facility] [id number] [address] - [phone]  - [phone] FX     Last office visit with prescribing clinician: 9/26/2022   Last telemedicine visit with prescribing clinician: 3/16/2023   Next office visit with prescribing clinician: 9/27/2023     Additional details provided by patient: PATIENT HAS ABOUT 2 WEEKS LEFT OF MEDICATION. PATIENT STATES SHE HAD SPOKEN TO PCP AT LAST VISIT ABOUT SWITCHING TO HER FOR HER MEDICATIONS. PATIENT HAS SCHEDULED HER PHYSICAL FOR 9/27/23    Does the patient have less than a 3 day supply:  [] Yes  [x] No    Would you like a call back once the refill request has been completed: [] Yes [x] No    If the office needs to give you a call back, can they leave a voicemail: [] Yes [x] No    Maria Elena Tate Rep   08/15/23 16:27 EDT

## 2023-08-16 RX ORDER — NORETHINDRONE ACETATE AND ETHINYL ESTRADIOL, ETHINYL ESTRADIOL AND FERROUS FUMARATE 1MG-10(24)
1 KIT ORAL DAILY
Qty: 84 TABLET | Refills: 0 | Status: SHIPPED | OUTPATIENT
Start: 2023-08-16

## 2023-08-22 DIAGNOSIS — Z30.41 ENCOUNTER FOR SURVEILLANCE OF CONTRACEPTIVE PILLS: ICD-10-CM

## 2023-08-22 RX ORDER — NORETHINDRONE ACETATE AND ETHINYL ESTRADIOL, ETHINYL ESTRADIOL AND FERROUS FUMARATE 1MG-10(24)
1 KIT ORAL DAILY
Qty: 84 TABLET | Refills: 0 | OUTPATIENT
Start: 2023-08-22

## 2023-08-23 DIAGNOSIS — Z30.41 ENCOUNTER FOR SURVEILLANCE OF CONTRACEPTIVE PILLS: ICD-10-CM

## 2023-08-23 RX ORDER — NORETHINDRONE ACETATE AND ETHINYL ESTRADIOL, ETHINYL ESTRADIOL AND FERROUS FUMARATE 1MG-10(24)
KIT ORAL
Qty: 84 TABLET | Refills: 0 | OUTPATIENT
Start: 2023-08-23

## 2023-09-27 DIAGNOSIS — F32.0 CURRENT MILD EPISODE OF MAJOR DEPRESSIVE DISORDER WITHOUT PRIOR EPISODE: ICD-10-CM

## 2023-09-27 DIAGNOSIS — E03.8 HYPOTHYROIDISM DUE TO HASHIMOTO'S THYROIDITIS: ICD-10-CM

## 2023-09-27 DIAGNOSIS — Z30.41 ENCOUNTER FOR SURVEILLANCE OF CONTRACEPTIVE PILLS: ICD-10-CM

## 2023-09-27 DIAGNOSIS — R41.840 ATTENTION AND CONCENTRATION DEFICIT: ICD-10-CM

## 2023-09-27 DIAGNOSIS — F41.9 ANXIETY: ICD-10-CM

## 2023-09-27 DIAGNOSIS — E06.3 HYPOTHYROIDISM DUE TO HASHIMOTO'S THYROIDITIS: ICD-10-CM

## 2023-09-27 RX ORDER — NORETHINDRONE ACETATE AND ETHINYL ESTRADIOL, ETHINYL ESTRADIOL AND FERROUS FUMARATE 1MG-10(24)
1 KIT ORAL DAILY
Qty: 84 TABLET | Refills: 0 | Status: SHIPPED | OUTPATIENT
Start: 2023-09-27

## 2023-09-27 RX ORDER — BUPROPION HYDROCHLORIDE 300 MG/1
300 TABLET ORAL EVERY MORNING
Qty: 90 TABLET | Refills: 0 | Status: SHIPPED | OUTPATIENT
Start: 2023-09-27

## 2023-09-27 RX ORDER — SERTRALINE HYDROCHLORIDE 100 MG/1
100 TABLET, FILM COATED ORAL DAILY
Qty: 90 TABLET | Refills: 0 | Status: SHIPPED | OUTPATIENT
Start: 2023-09-27

## 2023-09-27 RX ORDER — LEVOTHYROXINE SODIUM 0.1 MG/1
100 TABLET ORAL EVERY MORNING
Qty: 90 TABLET | Refills: 0 | Status: SHIPPED | OUTPATIENT
Start: 2023-09-27

## 2023-10-22 DIAGNOSIS — E06.3 HYPOTHYROIDISM DUE TO HASHIMOTO'S THYROIDITIS: ICD-10-CM

## 2023-10-22 DIAGNOSIS — E03.8 HYPOTHYROIDISM DUE TO HASHIMOTO'S THYROIDITIS: ICD-10-CM

## 2023-10-23 RX ORDER — LEVOTHYROXINE SODIUM 0.1 MG/1
TABLET ORAL
Qty: 90 TABLET | Refills: 0 | OUTPATIENT
Start: 2023-10-23

## 2023-10-27 DIAGNOSIS — E03.8 HYPOTHYROIDISM DUE TO HASHIMOTO'S THYROIDITIS: ICD-10-CM

## 2023-10-27 DIAGNOSIS — E06.3 HYPOTHYROIDISM DUE TO HASHIMOTO'S THYROIDITIS: ICD-10-CM

## 2023-10-30 DIAGNOSIS — E03.8 HYPOTHYROIDISM DUE TO HASHIMOTO'S THYROIDITIS: ICD-10-CM

## 2023-10-30 DIAGNOSIS — E06.3 HYPOTHYROIDISM DUE TO HASHIMOTO'S THYROIDITIS: ICD-10-CM

## 2023-10-30 RX ORDER — LEVOTHYROXINE SODIUM 0.1 MG/1
TABLET ORAL
Qty: 90 TABLET | Refills: 0 | OUTPATIENT
Start: 2023-10-30

## 2023-10-30 RX ORDER — LEVOTHYROXINE SODIUM 0.1 MG/1
100 TABLET ORAL EVERY MORNING
Qty: 90 TABLET | Refills: 0 | OUTPATIENT
Start: 2023-10-30

## 2023-10-30 NOTE — TELEPHONE ENCOUNTER
Caller: Kelley Crews    Relationship: Self    Best call back number: [phone]     Requested Prescriptions:   Requested Prescriptions     Pending Prescriptions Disp Refills    levothyroxine (SYNTHROID, LEVOTHROID) 100 MCG tablet 90 tablet 0     Sig: Take 1 tablet by mouth Every Morning. On an empty stomach        [facility] be sent[facility] [place] [id number] [phone] [phone] FX     Last office visit with prescribing clinician: 9/26/2022   Last telemedicine visit with prescribing clinician: Visit date not found   Next office visit with prescribing clinician: Visit date not found     Additional details provided by patient: PATIENT IS OUT OF MEDICATION AND HAS NOT SEEN HER ENDOCRINOLOGIST IN OVER A YEAR AND THEY WILL NOT REFILL THIS MEDICATION.    Does the patient have less than a 3 day supply:  [x] Yes  [] No    Would you like a call back once the refill request has been completed: [x] Yes [] No    If the office needs to give you a call back, can they leave a voicemail: [] Yes [x] No    Maria Elena Dubose Rep   10/30/23 08:46 EDT

## 2023-11-01 DIAGNOSIS — E03.8 HYPOTHYROIDISM DUE TO HASHIMOTO'S THYROIDITIS: ICD-10-CM

## 2023-11-01 DIAGNOSIS — E06.3 HYPOTHYROIDISM DUE TO HASHIMOTO'S THYROIDITIS: ICD-10-CM

## 2023-11-01 RX ORDER — LEVOTHYROXINE SODIUM 0.1 MG/1
100 TABLET ORAL EVERY MORNING
Qty: 90 TABLET | Refills: 0 | Status: SHIPPED | OUTPATIENT
Start: 2023-11-01 | End: 2023-11-02 | Stop reason: SDUPTHER

## 2023-11-01 NOTE — TELEPHONE ENCOUNTER
PT HAS NOT SEEN [facility] EVER SINCE THEY GOT HER THYROID UNDER CONTROL. SHE HAS [facility] SO SHE HAS TO CHANGE PCP AND HER APPT WITH NEW PCP IS NOT UNTIL 11/17 AND SHE WILL NEED A REFILL OF HER SYNTHROID BEFORE THEN. HER PCP WITH [facility] WAS NOT ABLE TO REFILL IT BECAUSE SHE WAS NOT THE ONE WHO PRESCRIBED IT. PT WANTS TO KNOW IF [facility] CAN GIVE HER ONE MORE REFILL TO LAST HER UNTIL HER APPT WITH NEW PCP. PLEASE CALL HER [phone] TO LET HER KNOW THE OUTCOME.

## 2023-11-02 ENCOUNTER — TELEPHONE (OUTPATIENT)
Dept: ENDOCRINOLOGY | Facility: CLINIC | Age: 28
End: 2023-11-02
Payer: COMMERCIAL

## 2023-11-02 DIAGNOSIS — E06.3 HYPOTHYROIDISM DUE TO HASHIMOTO'S THYROIDITIS: ICD-10-CM

## 2023-11-02 DIAGNOSIS — E03.8 HYPOTHYROIDISM DUE TO HASHIMOTO'S THYROIDITIS: ICD-10-CM

## 2023-11-02 RX ORDER — LEVOTHYROXINE SODIUM 0.1 MG/1
100 TABLET ORAL EVERY MORNING
Qty: 90 TABLET | Refills: 0 | Status: SHIPPED | OUTPATIENT
Start: 2023-11-02

## 2024-11-22 ENCOUNTER — OFFICE VISIT (OUTPATIENT)
Dept: FAMILY MEDICINE CLINIC | Facility: CLINIC | Age: 29
End: 2024-11-22
Payer: COMMERCIAL

## 2024-11-22 ENCOUNTER — LAB (OUTPATIENT)
Dept: LAB | Facility: HOSPITAL | Age: 29
End: 2024-11-22
Payer: COMMERCIAL

## 2024-11-22 VITALS
OXYGEN SATURATION: 98 % | DIASTOLIC BLOOD PRESSURE: 80 MMHG | BODY MASS INDEX: 28.77 KG/M2 | HEIGHT: 72 IN | SYSTOLIC BLOOD PRESSURE: 110 MMHG | HEART RATE: 87 BPM | WEIGHT: 212.4 LBS

## 2024-11-22 DIAGNOSIS — E06.3 HYPOTHYROIDISM DUE TO HASHIMOTO'S THYROIDITIS: ICD-10-CM

## 2024-11-22 DIAGNOSIS — Z00.00 PHYSICAL EXAM, ANNUAL: Primary | ICD-10-CM

## 2024-11-22 DIAGNOSIS — F41.9 ANXIETY: ICD-10-CM

## 2024-11-22 DIAGNOSIS — Z00.00 PHYSICAL EXAM, ANNUAL: ICD-10-CM

## 2024-11-22 DIAGNOSIS — Z30.41 ENCOUNTER FOR SURVEILLANCE OF CONTRACEPTIVE PILLS: ICD-10-CM

## 2024-11-22 DIAGNOSIS — R41.840 ATTENTION AND CONCENTRATION DEFICIT: ICD-10-CM

## 2024-11-22 DIAGNOSIS — F32.0 CURRENT MILD EPISODE OF MAJOR DEPRESSIVE DISORDER WITHOUT PRIOR EPISODE: ICD-10-CM

## 2024-11-22 DIAGNOSIS — Z23 IMMUNIZATION DUE: ICD-10-CM

## 2024-11-22 LAB
ALBUMIN SERPL-MCNC: 4.3 G/DL (ref 3.5–5.2)
ALBUMIN/GLOB SERPL: 1.2 G/DL
ALP SERPL-CCNC: 61 U/L (ref 39–117)
ALT SERPL W P-5'-P-CCNC: 10 U/L (ref 1–33)
ANION GAP SERPL CALCULATED.3IONS-SCNC: 11.1 MMOL/L (ref 5–15)
AST SERPL-CCNC: 14 U/L (ref 1–32)
BASOPHILS # BLD AUTO: 0.03 10*3/MM3 (ref 0–0.2)
BASOPHILS NFR BLD AUTO: 0.4 % (ref 0–1.5)
BILIRUB SERPL-MCNC: 0.3 MG/DL (ref 0–1.2)
BUN SERPL-MCNC: 11 MG/DL (ref 6–20)
BUN/CREAT SERPL: 12.5 (ref 7–25)
CALCIUM SPEC-SCNC: 9.6 MG/DL (ref 8.6–10.5)
CHLORIDE SERPL-SCNC: 101 MMOL/L (ref 98–107)
CHOLEST SERPL-MCNC: 147 MG/DL (ref 0–200)
CO2 SERPL-SCNC: 24.9 MMOL/L (ref 22–29)
CREAT SERPL-MCNC: 0.88 MG/DL (ref 0.57–1)
DEPRECATED RDW RBC AUTO: 38.4 FL (ref 37–54)
EGFRCR SERPLBLD CKD-EPI 2021: 90.8 ML/MIN/1.73
EOSINOPHIL # BLD AUTO: 0.19 10*3/MM3 (ref 0–0.4)
EOSINOPHIL NFR BLD AUTO: 2.4 % (ref 0.3–6.2)
ERYTHROCYTE [DISTWIDTH] IN BLOOD BY AUTOMATED COUNT: 12 % (ref 12.3–15.4)
GLOBULIN UR ELPH-MCNC: 3.7 GM/DL
GLUCOSE SERPL-MCNC: 86 MG/DL (ref 65–99)
HBA1C MFR BLD: 4.9 % (ref 4.8–5.6)
HCT VFR BLD AUTO: 40.8 % (ref 34–46.6)
HDLC SERPL-MCNC: 61 MG/DL (ref 40–60)
HGB BLD-MCNC: 14 G/DL (ref 12–15.9)
IMM GRANULOCYTES # BLD AUTO: 0.02 10*3/MM3 (ref 0–0.05)
IMM GRANULOCYTES NFR BLD AUTO: 0.2 % (ref 0–0.5)
LDLC SERPL CALC-MCNC: 74 MG/DL (ref 0–100)
LDLC/HDLC SERPL: 1.22 {RATIO}
LYMPHOCYTES # BLD AUTO: 1.71 10*3/MM3 (ref 0.7–3.1)
LYMPHOCYTES NFR BLD AUTO: 21.2 % (ref 19.6–45.3)
MCH RBC QN AUTO: 30.4 PG (ref 26.6–33)
MCHC RBC AUTO-ENTMCNC: 34.3 G/DL (ref 31.5–35.7)
MCV RBC AUTO: 88.5 FL (ref 79–97)
MONOCYTES # BLD AUTO: 0.7 10*3/MM3 (ref 0.1–0.9)
MONOCYTES NFR BLD AUTO: 8.7 % (ref 5–12)
NEUTROPHILS NFR BLD AUTO: 5.41 10*3/MM3 (ref 1.7–7)
NEUTROPHILS NFR BLD AUTO: 67.1 % (ref 42.7–76)
NRBC BLD AUTO-RTO: 0 /100 WBC (ref 0–0.2)
PLATELET # BLD AUTO: 407 10*3/MM3 (ref 140–450)
PMV BLD AUTO: 9.1 FL (ref 6–12)
POTASSIUM SERPL-SCNC: 4.5 MMOL/L (ref 3.5–5.2)
PROT SERPL-MCNC: 8 G/DL (ref 6–8.5)
RBC # BLD AUTO: 4.61 10*6/MM3 (ref 3.77–5.28)
SODIUM SERPL-SCNC: 137 MMOL/L (ref 136–145)
TRIGL SERPL-MCNC: 57 MG/DL (ref 0–150)
TSH SERPL DL<=0.05 MIU/L-ACNC: 2.08 UIU/ML (ref 0.27–4.2)
VLDLC SERPL-MCNC: 12 MG/DL (ref 5–40)
WBC NRBC COR # BLD AUTO: 8.06 10*3/MM3 (ref 3.4–10.8)

## 2024-11-22 PROCEDURE — 80050 GENERAL HEALTH PANEL: CPT

## 2024-11-22 PROCEDURE — 80061 LIPID PANEL: CPT

## 2024-11-22 PROCEDURE — 83036 HEMOGLOBIN GLYCOSYLATED A1C: CPT

## 2024-11-22 RX ORDER — SERTRALINE HYDROCHLORIDE 100 MG/1
100 TABLET, FILM COATED ORAL DAILY
Qty: 90 TABLET | Refills: 3 | Status: SHIPPED | OUTPATIENT
Start: 2024-11-22

## 2024-11-22 RX ORDER — LEVOTHYROXINE SODIUM 100 UG/1
100 TABLET ORAL EVERY MORNING
Qty: 90 TABLET | Refills: 3 | Status: SHIPPED | OUTPATIENT
Start: 2024-11-22

## 2024-11-22 RX ORDER — LEVOTHYROXINE SODIUM 100 UG/1
100 TABLET ORAL EVERY MORNING
Qty: 90 TABLET | Refills: 0 | Status: CANCELLED | OUTPATIENT
Start: 2024-11-22

## 2024-11-22 RX ORDER — NORETHINDRONE ACETATE AND ETHINYL ESTRADIOL, ETHINYL ESTRADIOL AND FERROUS FUMARATE 1MG-10(24)
1 KIT ORAL DAILY
Qty: 84 TABLET | Refills: 3 | Status: SHIPPED | OUTPATIENT
Start: 2024-11-22

## 2024-11-22 RX ORDER — BUPROPION HYDROCHLORIDE 300 MG/1
300 TABLET ORAL EVERY MORNING
Qty: 90 TABLET | Refills: 3 | Status: SHIPPED | OUTPATIENT
Start: 2024-11-22

## 2024-11-22 NOTE — LETTER
[facility]  Vaccine Consent Form    Patient Name:  Kelley Crews  Patient :  1995     Vaccine(s) Ordered    Tdap Vaccine Greater Than or Equal To 6yo IM        Screening Checklist  The following questions should be completed prior to vaccination. If you answer “yes” to any question, it does not necessarily mean you should not be vaccinated. It just means we may need to clarify or ask more questions. If a question is unclear, please ask your healthcare provider to explain it.    Yes No   Any fever or moderate to severe illness today (mild illness and/or antibiotic treatment are not contraindications)?     Do you have a history of a serious reaction to any previous vaccinations, such as anaphylaxis, encephalopathy within 7 days, Guillain-[place] syndrome within 6 weeks, seizure?     Have you received any live vaccine(s) (e.g MMR, BENJAMIN) or any other vaccines in the last month (to ensure duplicate doses aren't given)?     Do you have an anaphylactic allergy to latex (DTaP, DTaP-IPV, Hep A, Hep B, MenB, RV, Td, Tdap), baker’s yeast (Hep B, HPV), polysorbates (RSV, nirsevimab, PCV 20, Rotavirrus, Tdap, Shingrix), or gelatin (BENJAMIN, MMR)?     Do you have an anaphylactic allergy to neomycin (Rabies, BENJAMIN, MMR, IPV, Hep A), polymyxin B (IPV), or streptomycin (IPV)?      Any cancer, leukemia, AIDS, or other immune system disorder? (BENJAMIN, MMR, RV)     Do you have a parent, brother, or sister with an immune system problem (if immune competence of vaccine recipient clinically verified, can proceed)? (MMR, BENJAMIN)     Any recent steroid treatments for >2 weeks, chemotherapy, or radiation treatment? (BENJAMIN, MMR)     Have you received antibody-containing blood transfusions or IVIG in the past 11 months (recommended interval is dependent on product)? (MMR, BENJAMIN)     Have you taken antiviral drugs (acyclovir, famciclovir, valacyclovir for BENJAMIN) in the last 24 or 48 hours, respectively?      Are you pregnant or planning to become  "pregnant within 1 month? (BENJAMIN, MMR, HPV, IPV, MenB, Abrexvy; For Hep B- refer to Engerix-B; For RSV - Abrysvo is indicated for 32-36 weeks of pregnancy from September to January)     For infants, have you ever been told your child has had intussusception or a medical emergency involving obstruction of the intestine (Rotavirus)? If not for an infant, can skip this question.         *Ordering Physicians/APC should be consulted if \"yes\" is checked by the patient or guardian above.  I have received, read, and understand the Vaccine Information Statement (VIS) for each vaccine ordered.  I have considered my or my child's health status as well as the health status of my close contacts.  I have taken the opportunity to discuss my vaccine questions with my or my child's health care provider.   I have requested that the ordered vaccine(s) be given to me or my child.  I understand the benefits and risks of the vaccines.  I understand that I should remain in the clinic for 15 minutes after receiving the vaccine(s).  _________________________________________________________  Signature of Patient or Parent/Legal Guardian ____________________  Date     "

## 2024-11-22 NOTE — PROGRESS NOTES
Chief Complaint   Patient presents with    Annual Exam       Kelley Crews is a pleasant 29 y.o. female who is here for annual physical exam.  Patient is doing well today overall.  Was anxious thinking she might have to get pap smear.  Has had significant discomfort with these in the past.  Last pap smear was in 2021 and was normal.  She will obtain copy of report.  She has not had sexual intercourse since then.  Did complete pelvic floor therapy as recommended by gynecology.  She will check on immunization records for guardasil as well.  Taking birth control and this helps with acne.  She has very mild periods.  She reports stable mood on current medications.  Going to dentist, ophthalmologist.  Denies any moles or lesions of concern.  Reviewed vaccinations.    Past Medical History:   Diagnosis Date    Anxiety     Depression     Hypothyroidism        Past Surgical History:   Procedure Laterality Date    KNEE SURGERY Bilateral     05/2013, 06/2013    TONSILLECTOMY  04/2000    WISDOM TOOTH EXTRACTION  11/2010       Family History   Problem Relation Age of Onset    Hypothyroidism Mother     No Known Problems Father     Hypothyroidism Sister     Hypothyroidism Maternal Grandmother     Colon polyps Maternal Grandfather     Breast cancer Paternal Grandmother 75    Cancer Paternal Grandmother         Breast cancer    Heart attack Paternal Grandfather     Ovarian cancer Neg Hx     Colon cancer Neg Hx        Social History     Socioeconomic History    Marital status: Single   Tobacco Use    Smoking status: Never    Smokeless tobacco: Never   Vaping Use    Vaping status: Never Used   Substance and Sexual Activity    Alcohol use: Yes     Comment: Drink 1-2 times ever other month    Drug use: No    Sexual activity: Not Currently     Birth control/protection: Pill       No Known Allergies    ROS  Review of Systems   Constitutional:  Negative for chills, diaphoresis, fatigue and fever.   HENT:  Negative for congestion, ear  "pain, hearing loss, postnasal drip, rhinorrhea and sore throat.    Eyes:  Negative for blurred vision and pain.   Respiratory:  Negative for cough, shortness of breath and wheezing.    Cardiovascular:  Negative for chest pain and leg swelling.   Gastrointestinal:  Negative for abdominal pain, blood in stool, constipation, diarrhea, nausea, vomiting and indigestion.   Endocrine: Negative for polyuria.   Genitourinary:  Negative for dysuria, flank pain and hematuria.   Musculoskeletal:  Negative for arthralgias, gait problem and myalgias.   Skin:  Negative for rash and skin lesions.   Neurological:  Negative for dizziness and headache.   Psychiatric/Behavioral:  Positive for stress. Negative for self-injury, sleep disturbance, suicidal ideas and depressed mood. The patient is nervous/anxious.        Vitals:    11/22/24 1051   BP: 110/80   BP Location: Left arm   Patient Position: Sitting   Cuff Size: Adult   Pulse: 87   SpO2: 98%   Weight: 96.3 kg (212 lb 6.4 oz)   Height: 182.9 cm (72.01\")     Body mass index is 28.8 kg/m².    BMI is >= 25 and <30. (Overweight) The following options were offered after discussion;: exercise counseling/recommendations and nutrition counseling/recommendations       Current Outpatient Medications on File Prior to Visit   Medication Sig Dispense Refill    [DISCONTINUED] buPROPion XL (Wellbutrin XL) 300 MG 24 hr tablet Take 1 tablet by mouth Every Morning. 90 tablet 0    [DISCONTINUED] levothyroxine (SYNTHROID, LEVOTHROID) 100 MCG tablet Take 1 tablet by mouth Every Morning. On an empty stomach 90 tablet 0    [DISCONTINUED] Lo Loestrin Fe 1 MG-10 MCG / 10 MCG tablet Take 1 tablet by mouth Daily. 84 tablet 0    [DISCONTINUED] sertraline (Zoloft) 100 MG tablet Take 1 tablet by mouth Daily. 90 tablet 0    [DISCONTINUED] melatonin 5 MG tablet tablet Take 2 tablets by mouth. (Patient not taking: Reported on 11/22/2024)       No current facility-administered medications on file prior to visit. "       Results for orders placed or performed in visit on 08/15/22   Vitamin B12    Collection Time: 08/15/22  1:10 PM    Specimen: Blood   Result Value Ref Range    Vitamin B-12 1,888 (H) 211 - 946 pg/mL   Vitamin D 25 Hydroxy    Collection Time: 08/15/22  1:10 PM    Specimen: Blood   Result Value Ref Range    25 Hydroxy, Vitamin D 35.1 30.0 - 100.0 ng/ml   Comprehensive Metabolic Panel    Collection Time: 08/15/22  1:10 PM    Specimen: Blood   Result Value Ref Range    Glucose 79 65 - 99 mg/dL    BUN 7 6 - 20 mg/dL    Creatinine 0.78 0.57 - 1.00 mg/dL    Sodium 138 136 - 145 mmol/L    Potassium 4.6 3.5 - 5.2 mmol/L    Chloride 103 98 - 107 mmol/L    CO2 23.1 22.0 - 29.0 mmol/L    Calcium 9.6 8.6 - 10.5 mg/dL    Total Protein 7.2 6.0 - 8.5 g/dL    Albumin 4.50 3.50 - 5.20 g/dL    ALT (SGPT) 12 1 - 33 U/L    AST (SGOT) 14 1 - 32 U/L    Alkaline Phosphatase 43 39 - 117 U/L    Total Bilirubin 0.6 0.0 - 1.2 mg/dL    Globulin 2.7 gm/dL    A/G Ratio 1.7 g/dL    BUN/Creatinine Ratio 9.0 7.0 - 25.0    Anion Gap 11.9 5.0 - 15.0 mmol/L    eGFR 106.3 >60.0 mL/min/1.73   CBC Auto Differential    Collection Time: 08/15/22  1:10 PM    Specimen: Blood   Result Value Ref Range    WBC 7.28 3.40 - 10.80 10*3/mm3    RBC 4.53 3.77 - 5.28 10*6/mm3    Hemoglobin 13.2 12.0 - 15.9 g/dL    Hematocrit 40.3 34.0 - 46.6 %    MCV 89.0 79.0 - 97.0 fL    MCH 29.1 26.6 - 33.0 pg    MCHC 32.8 31.5 - 35.7 g/dL    RDW 12.6 12.3 - 15.4 %    RDW-SD 40.4 37.0 - 54.0 fl    MPV 9.8 6.0 - 12.0 fL    Platelets 325 140 - 450 10*3/mm3    Neutrophil % 64.1 42.7 - 76.0 %    Lymphocyte % 25.5 19.6 - 45.3 %    Monocyte % 8.1 5.0 - 12.0 %    Eosinophil % 1.8 0.3 - 6.2 %    Basophil % 0.4 0.0 - 1.5 %    Immature Grans % 0.1 0.0 - 0.5 %    Neutrophils, Absolute 4.66 1.70 - 7.00 10*3/mm3    Lymphocytes, Absolute 1.86 0.70 - 3.10 10*3/mm3    Monocytes, Absolute 0.59 0.10 - 0.90 10*3/mm3    Eosinophils, Absolute 0.13 0.00 - 0.40 10*3/mm3    Basophils, Absolute 0.03  0.00 - 0.20 10*3/mm3    Immature Grans, Absolute 0.01 0.00 - 0.05 10*3/mm3    nRBC 0.0 0.0 - 0.2 /100 WBC   Hepatitis C Antibody    Collection Time: 08/15/22  1:10 PM    Specimen: Blood   Result Value Ref Range    Hepatitis C Ab Non-Reactive Non-Reactive       PE    Physical Exam  Vitals reviewed.   Constitutional:       General: She is not in acute distress.     Appearance: Normal appearance. She is well-developed and normal weight. She is not ill-appearing or diaphoretic.   HENT:      Head: Normocephalic and atraumatic.      Right Ear: Hearing, tympanic membrane, ear canal and external ear normal.      Left Ear: Hearing, tympanic membrane, ear canal and external ear normal.      Nose: Nose normal.      Right Sinus: No maxillary sinus tenderness or frontal sinus tenderness.      Left Sinus: No maxillary sinus tenderness or frontal sinus tenderness.      Mouth/Throat:      Pharynx: Uvula midline.   Eyes:      General: Lids are normal.      Extraocular Movements: Extraocular movements intact.      Conjunctiva/sclera: Conjunctivae normal.   Neck:      Thyroid: No thyroid mass or thyromegaly.      Trachea: Trachea and phonation normal.   Cardiovascular:      Rate and Rhythm: Normal rate and regular rhythm.      Heart sounds: Normal heart sounds.   Pulmonary:      Effort: Pulmonary effort is normal.      Breath sounds: Normal breath sounds.   Abdominal:      General: Bowel sounds are normal. There is no distension.      Palpations: Abdomen is soft. Abdomen is not rigid.      Tenderness: There is no abdominal tenderness. There is no guarding.   Musculoskeletal:         General: Normal range of motion.      Cervical back: Normal range of motion.      Right lower leg: No edema.      Left lower leg: No edema.   Lymphadenopathy:      Cervical: No cervical adenopathy.      Right cervical: No superficial cervical adenopathy.     Left cervical: No superficial cervical adenopathy.   Skin:     General: Skin is warm.       Findings: No erythema or rash.      Nails: There is no clubbing.   Neurological:      Mental Status: She is alert and oriented to person, place, and time.      Coordination: Coordination normal.      Gait: Gait normal.      Deep Tendon Reflexes: Reflexes are normal and symmetric.      Comments: CN grossly intact   Psychiatric:         Attention and Perception: Attention and perception normal. She is attentive.         Mood and Affect: Mood and affect normal.         Speech: Speech normal.         Behavior: Behavior normal. Behavior is cooperative.         Thought Content: Thought content normal.         Cognition and Memory: Cognition and memory normal.         Judgment: Judgment normal.         A/P    Diagnoses and all orders for this visit:    1. Physical exam, annual (Primary)  -     CBC Auto Differential; Future  -     Comprehensive Metabolic Panel; Future  -     TSH Rfx On Abnormal To Free T4; Future  -     Lipid Panel; Future  -     Hemoglobin A1c; Future  PE completed  Preventative labs ordered  Gynecologist - last pap smear in 2021.  Patient will obtain copy of pap smear report for our office.  Not sexually active.  Will check on Guardasil vaccination as well.  Dentist - encouraged to go regularly  Ophthalmologist - encouraged to go regularly  Dermatologist - denies any moles or lesions of concern  Vaccinations discussed    2. Attention and concentration deficit  3. Anxiety  4. Current mild episode of major depressive disorder without prior episode  -     buPROPion XL (Wellbutrin XL) 300 MG 24 hr tablet; Take 1 tablet by mouth Every Morning.  Dispense: 90 tablet; Refill: 3  -     sertraline (Zoloft) 100 MG tablet; Take 1 tablet by mouth Daily.  Dispense: 90 tablet; Refill: 3  Stable, well-controlled.  Compliant on medication.    5. Hypothyroidism due to Hashimoto's thyroiditis  -     levothyroxine (SYNTHROID, LEVOTHROID) 100 MCG tablet; Take 1 tablet by mouth Every Morning. On an empty stomach  Dispense: 90  tablet; Refill: 3    6. Encounter for surveillance of contraceptive pills  -     Lo Loestrin Fe 1 MG-10 MCG / 10 MCG tablet; Take 1 tablet by mouth Daily.  Dispense: 84 tablet; Refill: 3    7. Immunization due  -     Tdap Vaccine Greater Than or Equal To 6yo IM         Plan of care reviewed with patient at the conclusion of today's visit. Education was provided regarding nutrition , exercise, supplements, preventative screenings, and vaccinations diagnosis, management and any prescribed or recommended OTC medications.  Patient verbalizes understanding of and agreement with management plan.    Dictated Utilizing Dragon Dictation     Please note that portions of this note were completed with a voice recognition program.     Part of this note may be an electronic transcription/translation of spoken language to printed text using the Dragon Dictation System.    Return in about 1 year (around 11/23/2025) for Annual physical.     My Paul PA-C

## 2025-01-14 DIAGNOSIS — Z30.41 ENCOUNTER FOR SURVEILLANCE OF CONTRACEPTIVE PILLS: ICD-10-CM

## 2025-01-14 RX ORDER — NORETHINDRONE ACETATE AND ETHINYL ESTRADIOL, ETHINYL ESTRADIOL AND FERROUS FUMARATE 1MG-10(24)
1 KIT ORAL DAILY
Qty: 84 TABLET | Refills: 3 | OUTPATIENT
Start: 2025-01-14

## 2025-07-24 DIAGNOSIS — Z78.9 USES BIRTH CONTROL: Primary | ICD-10-CM

## 2025-07-24 RX ORDER — NORETHINDRONE ACETATE AND ETHINYL ESTRADIOL 1MG-20(21)
1 KIT ORAL DAILY
Qty: 28 TABLET | Refills: 2 | Status: SHIPPED | OUTPATIENT
Start: 2025-07-24 | End: 2026-07-24